# Patient Record
(demographics unavailable — no encounter records)

---

## 2017-01-15 NOTE — UC
Thomas ESTES Michael, scribed for Hawa Petty MD on 01/15/17 at 1851 .





 Complaint Female HPI





- HPI Summary


HPI Summary: 





24 y/o female comes to [facility] presenting with constant dysuria that started 

gradually today. The pt reports that the pain is a 3 out of 10 on a pain 

assessment scale. She also c/o vaginal discharge and abd pain. The abd pain is 

described as cramping and does not radiate to other parts of her body. She was 

drinking alcohol one ago and felt "hungover" this morning. She also had 

unprotected intercourse last night. The pt is  A2. She had one miscarriage 

and one . The  occurred in 2016. Her LNMP was one 

month ago. The PMHx is significant for DM and 2 Cesarian sections. The pt 

denies taking birth control. Pt is a type I DM on the pump. 











- History Of Current Complaint


Chief Complaint: UCGU


Stated Complaint: POSSIBLE UTI


Time Seen by Provider: 01/15/17 18:20


Hx Obtained From: Patient, Medical Records


Hx Last Menstrual Period: 


Pregnant?: No


Onset/Duration: Gradual Onset, Lasting Days, Still Present


Timing: Constant


Severity Initially: Mild


Severity Currently: Mild


Pain Intensity: 3


Pain Scale Used: 0-10 Numeric


Character: Cramping


Aggravating Factor(s): Nothing


Alleviating Factor(s): Nothing


Associated Signs And Symptoms: Positive: Vaginal Discharge.  Negative: Negative 

- positive dysuria and abd pain, Fever


Related Hx:  - 4, Para - 2, Prior STD Hx - chlamydia





- Risk Factors


Ectopic Pregnancy Risk Factor: Negative


Ovarian Torsion Risk Factor: Negative





- Allergies/Home Medications


Allergies/Adverse Reactions: 


 Allergies











Allergy/AdvReac Type Severity Reaction Status Date / Time


 


Amoxicillin Allergy Severe Anaphylatic Verified 16 17:48





   Shock  


 


Cefaclor [From Ceclor] Allergy Severe Anaphylatic Verified 16 17:48





   Shock  


 


Penicillins Allergy Severe Anaphylatic Verified 16 17:48





   Shock  














PMH/Surg Hx/FS Hx/Imm Hx


Endocrine History Of: Reports: Diabetes - Type I for 11 years.





- Surgical History


Surgical History: Yes


Surgery Procedure, Year, and Place: C-SECTIONS x 2





- Family History


Known Family History: Positive: None


   Negative: Diabetes, Seizure Disorder, Blood Disorder





- Social History


Occupation: Employed Full-time


Lives: Alone


Alcohol Use: Rare


Substance Use Type: None


Substance Use Comment - Amount & Last Used: occasional


Smoking Status (MU): Light Every Day Tobacco Smoker


Type: Cigarettes


Amount Used/How Often: 1 PPD


Have You Smoked in the Last Year: Yes





- Immunization History


Most Recent Influenza Vaccination: 


Most Recent Tetanus Shot: UNSURE


Most Recent Pneumonia Vaccination: NEVER





Review of Systems


Constitutional: Negative - fever


Gastrointestinal: Abdominal Pain


Genitourinary: Dysuria, Other - vaginal discharge


All Other Systems Reviewed And Are Negative: Yes





Physical Exam


Triage Information Reviewed: Yes


Appearance: Well-Appearing, Well-Nourished, Pain Distress - mild


Vital Signs: 


 Initial Vital Signs











Temp  98.5 F   01/15/17 18:23


 


Pulse  75   01/15/17 18:23


 


Resp  18   01/15/17 18:23


 


Pulse Ox  98   01/15/17 18:23











Vital Signs Reviewed: Yes


Eyes: Positive: Conjunctiva Clear


ENT Exam: Normal


Neck: Positive: Supple


Respiratory: Positive: Lungs clear, Normal breath sounds, No respiratory 

distress


Cardiovascular: Positive: RRR, No Murmur, Pulses Normal, Brisk Capillary Refill


Abdomen Description: Positive: Nontender, No Organomegaly, Soft, Other: - 

moderate vaginal discharge that was white. Cervix closed and non-tender. Uterus 

nml size and non-tender. Adnexae no masses and nontender.  Negative: CVA 

Tenderness (R), CVA Tenderness (L), Distended, Guarding, [place]'s [place] 

Tenderness, Peritoneal Signs


Bowel Sounds: Positive: Present


Musculoskeletal: Positive: Strength Intact, ROM Intact


Neurological: Positive: Alert, Muscle Tone Normal


Psychological Exam: Normal


Skin Exam: Normal





 Complaint Female Dx





- Course


Course Of Treatment: Pt declines HIV testing and blood testing (syphilis).  

States she would like to treated for all STD's, is allergic to PCN and 

cephalosporin





- Differential Dx/Diagnosis


Differential Diagnosis/HQI/PQRI: Pelvic Inflammatory Disease, Urinary Tract 

Infection


Provider Diagnoses: vaginitis.  dysuria





Discharge





- Discharge Plan


Condition: Stable


Disposition: HOME


Prescriptions: 


DOXYcycline CAP(*) [DOXYcycline 100MG CAP(*)] 100 mg PO BID #20 cap


Patient Education Materials:  Sexually Transmitted Diseases (ED)


Referrals: 


Barber Cifuentes MD [Primary Care Provider] - 


Additional Instructions: 


Dr. Petty has treated you with azithromycin 1000mg orally tonight, that is the 

treatment for chlamydia.


She gave the first pill and has also sent a prescription for doxycycline 100mg, 

that needs to be taken twice a day for 10 days.  This is the treatment for 

gonorrhea.


You have chosen to have us treat you empirically for STDS, and we have taken 

note of your penicillin and cefaclor allergy in choosing your treatment.  You 

have chosen not to be treated for syphilis or HIV at this time.  


We have also sent a culture of your urine and your vaginal discharge.


We will notify you if you need further treatment based on those cultures, which 

will be back in a few days. 


Return to urgent care if you have any new or worsening symptoms.  





The documentation as recorded by the Thomas cortez Michael accurately 

reflects the service I personally performed and the decisions made by me, Hawa Petty MD.

## 2017-03-25 NOTE — UC
Complaint Female HPI





- HPI Summary


HPI Summary: 





Pleasant 24 yo female presents for evaluation treatment of white vag d/c and 

pruritus over the last couple days.  No fever / chills.  No abd or pelvic pain.

  Hx of similar in the past, and diflucan has helped.  Wasn't able to see her 

doctor today, but plans to f/u.  Does have a hx T 1 DM, has a pump, last few 

blood sugars a little high in the 200's.  Last period one month ago, normal. 





- History Of Current Complaint


Chief Complaint: UCGU


Stated Complaint: DISCHARGE


Time Seen by Provider: 03/24/17 22:20


Hx Obtained From: Patient


Hx Last Menstrual Period: 2/24/17


Onset/Duration: Gradual Onset


Pain Intensity: 0


Pain Scale Used: 0-10 Numeric





- Allergies/Home Medications


Allergies/Adverse Reactions: 


 Allergies











Allergy/AdvReac Type Severity Reaction Status Date / Time


 


Amoxicillin Allergy Severe Anaphylatic Verified 03/24/17 21:13





   Shock  


 


Cefaclor [From Ceclor] Allergy Severe Anaphylatic Verified 03/24/17 21:13





   Shock  


 


Penicillins Allergy Severe Anaphylatic Verified 03/24/17 21:13





   Shock  











Home Medications: 


 Home Medications





Escitalopram Oxalate [Lexapro] 20 mg PO 03/24/17 [History]


hydrOXYzine HCL TAB* [Atarax TAB 50 MG *] 50 mg PO DAILY 03/24/17 [History 

Confirmed 03/24/17]











PMH/Surg Hx/FS Hx/Imm Hx


Previously Healthy: Yes - see hpi


Endocrine History Of: Reports: Diabetes - Type I for 11 years.


   Denies: Thyroid Disease, Hyperthyroidism, Hypothyroidism, Dyslipidemia


Cardiovascular History Of: 


   Denies: Cardiac Disorders, Hypertension, Pacemaker/ICD, Myocardial Infarction

, Congestive Heart Failure, Atrial Fibrillation, Deep Vein Thrombosis, Bleeding 

Disorders


Respiratory History Of: 


   Denies: COPD, Asthma, Bronchitis, Pneumonia, Pulmonary Embolism


GI/ History Of: 


   Denies: Gastroesophageal Reflux, Ulcer, Gastrointestinal Bleed, Gall Bladder 

Disease, Kidney Stones, Diverticulitis, Renal Disease, Urosepsis


Neurological History Of: 


   Denies: TIA, CVA, Dementia, Seizures, Migraine


Psychological History Of: 


   Denies: Anxiety, Depression, Bipolar Disorder, Schizophrenia, Post Traumatic 

Stress Disorder


Cancer History Of: 


   Denies: Lung Cancer, Colorectal Cancer, Breast Cancer, Prostate Cancer, 

Cervical Cancer


Other History Of: 


   Negative For: HIV, Hepatitis B, Hepatitis C





- Surgical History


Surgical History: Yes


Surgery Procedure, Year, and Place: C-SECTIONS x 2





- Family History


Known Family History: Positive: None


   Negative: Diabetes, Seizure Disorder, Blood Disorder





- Social History


Alcohol Use: None


Substance Use Type: None


Substance Use Comment - Amount & Last Used: occasional


Smoking Status (MU): Light Every Day Tobacco Smoker


Type: Cigarettes


Amount Used/How Often: 1 PPD


Length of Time of Smoking/Using Tobacco: 7


Have You Smoked in the Last Year: Yes





- Immunization History


Most Recent Influenza Vaccination: 2010


Most Recent Tetanus Shot: UNSURE


Most Recent Pneumonia Vaccination: NEVER





Review of Systems


Constitutional: Negative


Skin: Other - see hpi


Eyes: Negative


ENT: Negative


Respiratory: Negative


Cardiovascular: Negative


Gastrointestinal: Negative


Genitourinary: Other - freq / urgency.  But not sure if abnormal.


Motor: Negative


Neurovascular: Negative


Musculoskeletal: Negative


Neurological: Negative


Psychological: Negative


All Other Systems Reviewed And Are Negative: Yes





Physical Exam


Triage Information Reviewed: Yes


Appearance: Well-Appearing, Well-Nourished


Vital Signs: 


 Initial Vital Signs











Temp  97.7 F   03/24/17 21:06


 


Pulse  89   03/24/17 21:06


 


Resp  16   03/24/17 21:06


 


BP  119/64   03/24/17 21:06


 


Pulse Ox  96   03/24/17 21:06











Vital Signs Reviewed: Yes


ENT Exam: Normal


Neck exam: Normal


Respiratory Exam: Normal


Respiratory: Positive: Chest non-tender, Lungs clear, Normal breath sounds, No 

respiratory distress, No accessory muscle use


Cardiovascular Exam: Normal


Cardiovascular: Positive: RRR, No Murmur, Pulses Normal, Brisk Capillary Refill


Abdominal Exam: Normal


Abdomen Description: Positive: Nontender, No Organomegaly - pump in place, Soft

, Other: - Pelvic nontender.  No cvat.  Pt declines focused pelvic exam, citing 

that her current sx are just like past yeast vaginitis sx, most recently in the 

last 1-2 months.


Musculoskeletal Exam: Normal - gait steady.  moves all 4 ext's.


Neurological Exam: Normal - nonfocal.  grossly normal.


Psychological Exam: Normal - conversing easily and appropriately.


Skin Exam: Normal - no visible or reported rash, exp pruritus as per hpi





 Complaint Female Dx





- Course


Course Of Treatment: No new problems in CCC.  Reviewed urine dip / ucg (neg).  

Will start diflucan (we only have 100mg available here, as such will dose with 

200mg).  Rx written so that she can take following day.  Plans to f/u with pcp, 

per routine, will call on Monday.  Will seek medical attention sooner for worse 

or new problems in the meantime.  Questions answered as posed.





- Differential Dx/Diagnosis


Provider Diagnoses: Vaginitis.





Discharge





- Discharge Plan


Condition: Stable


Disposition: HOME


Prescriptions: 


Fluconazole 100 MG TAB* [Diflucan 100 MG TAB*] 150 mg PO DAILY #2 tab


Patient Education Materials:  Vulvovaginal Candidiasis (ED)


Referrals: 


Barber Cifuentes MD [Primary Care Provider] - 


Additional Instructions: 


Follow up with your primary care physician, per routine.  





You have a urine culture in the lab.  





Seek medical attention for worse or new problems.

## 2017-06-15 NOTE — UC
Complaint Female HPI





- HPI Summary


HPI Summary: 





Vaginal itching and burning for 1.5 weeks. Took oral diflucan last week with 

temporary relief, then much worse starting 3 days ago. Has also started a new 

sexual relationship with a male, interested in GC/Chlamydia testing and urine 

hcg due to late menses.





- History Of Current Complaint


Chief Complaint: UCGeneralIllness


Stated Complaint: PERSONAL


Time Seen by Provider: 06/15/17 14:02


Hx Obtained From: Patient


Hx Last Menstrual Period: May 6, 2017


Onset/Duration: Gradual Onset, Lasting Weeks


Timing: Intermittent


Severity Initially: Mild


Severity Currently: Mild


Character: Burning


Aggravating Factor(s): Urination


Associated Signs And Symptoms: Negative: Fever, Back Pain, Vaginal Bleeding/

Discharge, Vaginal Discharge, Vomiting(# Of Episodes =)





- Allergies/Home Medications


Allergies/Adverse Reactions: 


 Allergies











Allergy/AdvReac Type Severity Reaction Status Date / Time


 


Amoxicillin Allergy Severe Anaphylatic Verified 06/15/17 14:06





   Shock  


 


Cefaclor [From Ceclor] Allergy Severe Anaphylatic Verified 06/15/17 14:06





   Shock  


 


Penicillins Allergy Severe Anaphylatic Verified 06/15/17 14:06





   Shock  











Home Medications: 


 Home Medications





Miconazole Nitrate Vaginal [Monistat 1 Combo Pack 1200 & 2 mg & %]  06/15/17 [

History]











PMH/Surg Hx/FS Hx/Imm Hx


Other History Of: 


   Negative For: HIV, Hepatitis B, Hepatitis C





- Surgical History


Surgical History: Yes


Surgery Procedure, Year, and Place: C-SECTIONS x 2





- Family History


Known Family History: 


   Negative: Diabetes, Seizure Disorder, Blood Disorder





- Social History


Alcohol Use: None


Substance Use Type: None


Substance Use Comment - Amount & Last Used: occasional


Smoking Status (MU): Heavy Every Day Tobacco Smoker


Type: Cigarettes


Amount Used/How Often: 1 PPD


Length of Time of Smoking/Using Tobacco: 7


Have You Smoked in the Last Year: Yes


Cessation Counseling: Patient Advised to Stop





- Immunization History


Most Recent Influenza Vaccination: 2010


Most Recent Tetanus Shot: UNSURE


Most Recent Pneumonia Vaccination: NEVER





Review of Systems


Constitutional: Negative


Skin: Negative


Eyes: Negative


ENT: Negative


Respiratory: Negative


Cardiovascular: Negative


Gastrointestinal: Negative


Genitourinary: Other - vaginal itching


Motor: Negative


Neurovascular: Negative


Musculoskeletal: Negative


Neurological: Negative


Psychological: Negative


All Other Systems Reviewed And Are Negative: Yes





Physical Exam


Triage Information Reviewed: Yes


Appearance: Well-Appearing, No Pain Distress, Well-Nourished


Vital Signs: 


 Initial Vital Signs











Pulse  81   06/15/17 13:57


 


Resp  14   06/15/17 13:57


 


BP  108/59   06/15/17 13:57


 


Pulse Ox  99   06/15/17 13:57











Vital Signs Reviewed: Yes


Eye Exam: Normal


Eyes: Positive: Conjunctiva Clear


ENT Exam: Normal


ENT: Positive: Normal ENT inspection, Hearing grossly normal, Pharynx normal, 

TMs normal


Dental Exam: Normal


Neck exam: Normal


Neck: Positive: Supple, Nontender, No Lymphadenopathy


Respiratory Exam: Normal


Respiratory: Positive: Chest non-tender, Lungs clear, Normal breath sounds, No 

respiratory distress, No accessory muscle use


Cardiovascular Exam: Normal


Cardiovascular: Positive: RRR, No Murmur


Abdominal Exam: Normal


Abdomen Description: Positive: Nontender, No Organomegaly, Soft


Musculoskeletal Exam: Normal


Neurological Exam: Normal


Neurological: Positive: Alert


Psychological Exam: Normal


Skin Exam: Normal





- Additional Comments





pelvic exam performed, no ulcers, lesions, or masses to external genitalia. 

minor diffuse edema and redness to labia minora. Vaginal mucosa obscured by 

thick white vaginal medicine, swabs obtained and cervix nontender, no bleeding 

or masses. Adnexa nontender.





 Complaint Female Dx





- Differential Dx/Diagnosis


Provider Diagnoses: vaginitis, suspect yeast





Discharge





- Discharge Plan


Condition: Stable


Disposition: HOME


Prescriptions: 


Fluconazole 100 MG TAB* [Diflucan 100 MG TAB*] 150 mg PO DAILY #2 tab


Patient Education Materials:  Vaginitis (ED)


Referrals: 


Barber Cifuentes MD [Primary Care Provider] - 


Additional Instructions: 


I am treating you for yeast, but I have sent in testing for other possible 

causes for your symptoms as well. Please call or return if you have new or 

worsening symptoms.

## 2017-06-30 NOTE — ED
HPI Diabetic





- HPI Summary


HPI Summary: 


25F w/ PMH of DM1 presents with hypoglycemia today.  She was at home and her 

sugar was 30.  She has an insulin pump which she has had for 2 years.  She 

denies any recent illness.  This is the second episode of this this week.  Her 

boyfriend placed sugar in her gums and she became responsive again.  She states 

she has been more active recently.  She does not have anyone managing her DM at 

the moment as she lost her insurance and can not be seen by dr Coker anymore.  

She denies any cough, abdominal pain, or UTI symptoms








- History Of Current Complaint


Chief Complaint: EDDiabeticProb


Time Seen by Provider: 06/30/17 09:01





- Allergies/Home Medications


Allergies/Adverse Reactions: 


 Allergies











Allergy/AdvReac Type Severity Reaction Status Date / Time


 


Amoxicillin Allergy Severe Anaphylatic Verified 06/15/17 14:06





   Shock  


 


Cefaclor [From Ceclor] Allergy Severe Anaphylatic Verified 06/15/17 14:06





   Shock  


 


Penicillins Allergy Severe Anaphylatic Verified 06/15/17 14:06





   Shock  














PMH/Surg Hx/FS Hx/Imm Hx


Endocrine/Hematology History: Reports: Hx Diabetes - Type I for 11 years.


   Denies: Hx Thyroid Disease


Cardiovascular History: 


   Denies: Hx Congestive Heart Failure, Hx Deep Vein Thrombosis, Hx Hypertension

, Hx Myocardial Infarction, Hx Pacemaker/ICD


Respiratory History: 


   Denies: Hx Asthma, Hx Chronic Obstructive Pulmonary Disease (COPD), Hx Lung 

Cancer, Hx Pneumonia, Hx Pulmonary Embolism


GI History: 


   Denies: Hx Gall Bladder Disease, Hx Gastrointestinal Bleed, Hx Ulcer, Hx 

Urosepsis


 History: Reports: Other  Problems/Disorders - UTI


   Denies: Hx Kidney Stones, Hx Renal Disease


Neurological History: 


   Denies: Hx Dementia, Hx Migraine, Hx Seizures, Hx Transient Ischemic Attacks 

(TIA)


Psychiatric History: 


   Denies: Hx Anxiety, Hx Depression, Hx Schizophrenia, Hx Bipolar Disorder





- Surgical History


Surgery Procedure, Year, and Place: C-SE[facility] x 2


Hx Anesthesia Reactions: No


Infectious Disease History: No


Infectious Disease History: Reports: Hx of Known/Suspected MRSA - back june 2016


   Denies: Hx Clostridium Difficile, Hx Hepatitis, Hx Human Immunodeficiency 

Virus (HIV), Hx Shingles, Hx Tuberculosis, Hx Known/Suspected VRE, Hx Known/

Suspected VRSA, History Other Infectious Disease - MRSA, Traveled Outside the 

[place] in Last 30 Days





- Family History


Known Family History: 


   Negative: Diabetes, Seizure Disorder, Blood Disorder





- Social History


Alcohol Use: None


Hx Substance Use: No


Substance Use Type: Reports: None


Substance Use Comment - Amount & Last Used: occasional


Hx Tobacco Use: Yes


Smoking Status (MU): Heavy Every Day Tobacco Smoker


Type: Cigarettes


Amount Used/How Often: 1 PPD


Length of Time of Smoking/Using Tobacco: 7


Have You Smoked in the Last Year: Yes





Review of Systems


Negative: Fever


Negative: Chest Pain


Negative: Shortness Of Breath


Negative: Abdominal Pain


All Other Systems Reviewed And Are Negative: Yes





Physical Exam


Triage Information Reviewed: Yes


Vital Signs On Initial Exam: 


 Initial Vitals











Temp Pulse Resp BP Pulse Ox


 


 97.3 F   80   17   131/81   100 


 


 06/30/17 08:51  06/30/17 08:51  06/30/17 08:51  06/30/17 08:51  06/30/17 08:51











Vital Signs Reviewed: Yes


Appearance: Positive: Well-Appearing


Skin: Positive: Warm, Dry


Head/Face: Positive: Normal Head/Face Inspection


Eyes: Positive: Normal, Conjunctiva Clear


ENT: Positive: Normal ENT inspection, Pharynx normal, TMs normal


Respiratory/Lung Sounds: Positive: Clear to Auscultation, Breath Sounds Present


Cardiovascular: Positive: Normal, RRR


Abdomen Description: Positive: Nontender, Soft


Bowel Sounds: Positive: Present


Neurological: Positive: Sensory/Motor Intact, Alert, Oriented to Person Place, 

Time, CN Intact II-III





- Sendy Coma Scale


Coma Scale Total: 15





Diagnostics





- Vital Signs


 Vital Signs











  Temp Pulse Resp BP Pulse Ox


 


 06/30/17 09:06   68    100


 


 06/30/17 09:04  98.4 F  61  18  119/65  100


 


 06/30/17 09:03     119/65 


 


 06/30/17 08:51  97.3 F  80  17  131/81  100














- Laboratory


Lab Results: 


 Lab Results











  06/30/17 06/30/17 06/30/17 Range/Units





  08:59 09:45 09:45 


 


WBC   5.8   (3.5-10.8)  10^3/ul


 


RBC   4.15   (4.0-5.4)  10^6/ul


 


Hgb   13.0   (12.0-16.0)  g/dl


 


Hct   39   (35-47)  %


 


MCV   93   (80-97)  fL


 


MCH   31   (27-31)  pg


 


MCHC   34   (31-36)  g/dl


 


RDW   13   (10.5-15)  %


 


Plt Count   239   (150-450)  10^3/ul


 


MPV   8   (7.4-10.4)  um3


 


Neut % (Auto)   62.8   (38-83)  %


 


Lymph % (Auto)   30.3   (25-47)  %


 


Mono % (Auto)   5.1   (1-9)  %


 


Eos % (Auto)   1.2   (0-6)  %


 


Baso % (Auto)   0.6   (0-2)  %


 


Absolute Neuts (auto)   3.6   (1.5-7.7)  10^3/ul


 


Absolute Lymphs (auto)   1.8   (1.0-4.8)  10^3/ul


 


Absolute Monos (auto)   0.3   (0-0.8)  10^3/ul


 


Absolute Eos (auto)   0.1   (0-0.6)  10^3/ul


 


Absolute Basos (auto)   0   (0-0.2)  10^3/ul


 


Absolute Nucleated RBC   0   10^3/ul


 


Nucleated RBC %   0   


 


Sodium     (133-145)  mmol/L


 


Potassium     (3.5-5.0)  mmol/L


 


Chloride     (101-111)  mmol/L


 


Carbon Dioxide     (22-32)  mmol/L


 


Anion Gap     (2-11)  mmol/L


 


BUN     (6-24)  mg/dL


 


Creatinine     (0.51-0.95)  mg/dL


 


Est GFR ( Amer)     (>60)  


 


Est GFR (Non-Af Amer)     (>60)  


 


BUN/Creatinine Ratio     (8-20)  


 


Glucose     ()  mg/dL


 


POC Glucose (mg/dL)  59 L    ()  mg/dL


 


Calcium     (8.6-10.3)  mg/dL


 


Total Bilirubin     (0.2-1.0)  mg/dL


 


AST     (13-39)  U/L


 


ALT     (7-52)  U/L


 


Alkaline Phosphatase     ()  U/L


 


Total Protein     (6.4-8.9)  g/dL


 


Albumin     (3.2-5.2)  g/dL


 


Globulin     (2-4)  g/dL


 


Albumin/Globulin Ratio     (1-3)  


 


Urine Color    Yellow  


 


Urine Appearance    Clear  


 


Urine pH    6.0  (5-9)  


 


Ur Specific Gravity    1.018  (1.010-1.030)  


 


Urine Protein    Negative  (Negative)  


 


Urine Ketones    Negative  (Negative)  


 


Urine Blood    Negative  (Negative)  


 


Urine Nitrate    Negative  (Negative)  


 


Urine Bilirubin    Negative  (Negative)  


 


Urine Urobilinogen    Negative  (Negative)  


 


Ur Leukocyte Esterase    Negative  (Negative)  


 


Urine Glucose    Negative  (Negative)  














  06/30/17 Range/Units





  09:45 


 


WBC   (3.5-10.8)  10^3/ul


 


RBC   (4.0-5.4)  10^6/ul


 


Hgb   (12.0-16.0)  g/dl


 


Hct   (35-47)  %


 


MCV   (80-97)  fL


 


MCH   (27-31)  pg


 


MCHC   (31-36)  g/dl


 


RDW   (10.5-15)  %


 


Plt Count   (150-450)  10^3/ul


 


MPV   (7.4-10.4)  um3


 


Neut % (Auto)   (38-83)  %


 


Lymph % (Auto)   (25-47)  %


 


Mono % (Auto)   (1-9)  %


 


Eos % (Auto)   (0-6)  %


 


Baso % (Auto)   (0-2)  %


 


Absolute Neuts (auto)   (1.5-7.7)  10^3/ul


 


Absolute Lymphs (auto)   (1.0-4.8)  10^3/ul


 


Absolute Monos (auto)   (0-0.8)  10^3/ul


 


Absolute Eos (auto)   (0-0.6)  10^3/ul


 


Absolute Basos (auto)   (0-0.2)  10^3/ul


 


Absolute Nucleated RBC   10^3/ul


 


Nucleated RBC %   


 


Sodium  137  (133-145)  mmol/L


 


Potassium  3.9  (3.5-5.0)  mmol/L


 


Chloride  105  (101-111)  mmol/L


 


Carbon Dioxide  26  (22-32)  mmol/L


 


Anion Gap  6  (2-11)  mmol/L


 


BUN  10  (6-24)  mg/dL


 


Creatinine  0.92  (0.51-0.95)  mg/dL


 


Est GFR ( Amer)  95.7  (>60)  


 


Est GFR (Non-Af Amer)  74.4  (>60)  


 


BUN/Creatinine Ratio  10.9  (8-20)  


 


Glucose  103 H  ()  mg/dL


 


POC Glucose (mg/dL)   ()  mg/dL


 


Calcium  9.0  (8.6-10.3)  mg/dL


 


Total Bilirubin  0.40  (0.2-1.0)  mg/dL


 


AST  15  (13-39)  U/L


 


ALT  11  (7-52)  U/L


 


Alkaline Phosphatase  35  ()  U/L


 


Total Protein  6.6  (6.4-8.9)  g/dL


 


Albumin  4.1  (3.2-5.2)  g/dL


 


Globulin  2.5  (2-4)  g/dL


 


Albumin/Globulin Ratio  1.6  (1-3)  


 


Urine Color   


 


Urine Appearance   


 


Urine pH   (5-9)  


 


Ur Specific Gravity   (1.010-1.030)  


 


Urine Protein   (Negative)  


 


Urine Ketones   (Negative)  


 


Urine Blood   (Negative)  


 


Urine Nitrate   (Negative)  


 


Urine Bilirubin   (Negative)  


 


Urine Urobilinogen   (Negative)  


 


Ur Leukocyte Esterase   (Negative)  


 


Urine Glucose   (Negative)  











Result Diagrams: 


 06/30/17 09:45





 06/30/17 09:45


Lab Statement: Any lab studies that have been ordered have been reviewed, and 

results considered in the medical decision making process.





Diabetic Course/Dx





- Course


Course Of Treatment: 25F w/ PMH of DM1 presents with hypoglycemia today.  She 

was at home and her sugar was 30.  She has an insulin pump which she has had 

for 2 years.  She denies any recent illness.  This is the second episode of 

this this week.  Her boyfriend placed sugar in her gums and she became 

responsive again.  She states she has been more active recently.  She does not 

have anyone managing her DM at the moment as she lost her insurance and can not 

be seen by dr Coker anymore.  inital glucose was 59 so gave juice and sugar 103. 

patient does not have pump on at moment. discussed likely need to lower basal 

insulin be a couple units but needs follow up with endo. social work arranged 

follow up. sent script for insulin. patient understands and agrees with plan





- Diagnoses


Differential Dx: Hyperglycemia, Hypoglycemia


Provider Diagnoses: 


 Hypoglycemia








Discharge





- Discharge Plan


Condition: Good


Disposition: HOME


Prescriptions: 


Insulin Lispro [Humalog] 100 unit SC DAILY #3 vial


Patient Education Materials:  Hypoglycemia in a Person with Diabetes (ED)


Referrals: 


Jason Turcios MD [Medical Doctor] - 07/05/17 9:45 am (This is the soonest appt 

available. No other appts available for several weeks.)


Additional Instructions: 


Follow up with primary as soon as possible


Lower baseline insulin by a couple of units


Check blood sugar in the middle of the night to make sure do not become 

hypoglycemia


Return to ED if develop any new or worsening symptoms

## 2017-08-11 NOTE — UC
Complaint Female HPI





- HPI Summary


HPI Summary: 





25 year old female presents with complains of urinary frequency, urgency and 

burning. 





- History Of Current Complaint


Stated Complaint: POSS UTI


Time Seen by Provider: 08/11/17 15:15


Hx Last Menstrual Period: May 6, 2017





- Allergies/Home Medications


Allergies/Adverse Reactions: 


 Allergies











Allergy/AdvReac Type Severity Reaction Status Date / Time


 


Amoxicillin Allergy Severe Anaphylatic Verified 08/11/17 15:28





   Shock  


 


Cefaclor [From Ceclor] Allergy Severe Anaphylatic Verified 08/11/17 15:28





   Shock  


 


Penicillins Allergy Severe Anaphylatic Verified 08/11/17 15:28





   Shock  














PMH/Surg Hx/FS Hx/Imm Hx


Previously Healthy: Yes


Other History Of: 


   Negative For: HIV, Hepatitis B, Hepatitis C





- Surgical History


Surgical History: Yes


Surgery Procedure, Year, and Place: C-SE[facility] x 2





- Family History


Known Family History: 


   Negative: Diabetes, Seizure Disorder, Blood Disorder





- Social History


Alcohol Use: None


Substance Use Type: None


Substance Use Comment - Amount & Last Used: occasional


Smoking Status (MU): Heavy Every Day Tobacco Smoker


Type: Cigarettes


Amount Used/How Often: 1 PPD


Length of Time of Smoking/Using Tobacco: 7


Have You Smoked in the Last Year: Yes





- Immunization History


Most Recent Influenza Vaccination: 2010


Most Recent Tetanus Shot: UNSURE


Most Recent Pneumonia Vaccination: NEVER





Review of Systems


Constitutional: Negative


Skin: Negative


Eyes: Negative


ENT: Negative


Respiratory: Negative


Cardiovascular: Negative


Gastrointestinal: Negative


Genitourinary: Dysuria, Frequency, Urgency


Motor: Negative


Neurovascular: Negative


Musculoskeletal: Negative


Neurological: Negative


Psychological: Negative


All Other Systems Reviewed And Are Negative: Yes





Physical Exam


Triage Information Reviewed: Yes


Eye Exam: Normal


ENT Exam: Normal


Dental Exam: Normal


Neck exam: Normal


Neck: Positive: 1


Respiratory Exam: Normal


Cardiovascular Exam: Normal


Abdominal Exam: Normal


Musculoskeletal Exam: Normal


Neurological Exam: Normal


Psychological Exam: Normal


Skin Exam: Normal





 Complaint Female Dx





- Differential Dx/Diagnosis


Provider Diagnoses: urinary frequency.  urinary urgency





Discharge





- Discharge Plan


Condition: Stable


Disposition: HOME


Prescriptions: 


Fluconazole [Fluconazole 150 mg tab] 150 mg PO ONCE #1 tab


Nitrofurantoin Monohyd Macro [Macrobid] 100 mg PO BID #14 cap


Patient Education Materials:  Urinary Tract Infection in Women (ED)


Referrals: 


Barber Cifuentes MD [Primary Care Provider] - If Needed

## 2017-08-13 NOTE — ED
Course/Dx





- Course


Course Of Treatment: URINE CX CAME BACK WITH STAPH AUREUS THAT IS SUSCEPTIBLE 

TO MACROBID. PT RXED MACROBID X 7 DAYS.





- Diagnoses


Provider Diagnoses: 


 UTI (urinary tract infection)

## 2018-02-11 NOTE — ED
Ashkan ESTES Tecjoon, scribed for Dmitry Zhu MD on 02/11/18 at 0004 .





Skin Complaint





- HPI Summary


HPI Summary: 





This patient is a 26 year old female presenting to [facility] accompanied by male 

 with a chief complaint of skin abscess on right arm since 3 days ago. 

The pain is rated 7/10 in severity. Symptoms aggravated by nothing. Symptoms 

alleviated by nothing. Patient additionally reports chills, discharge from 

abscess.





- History of Current Complaint


Chief Complaint: EDRashSkinAbscess


Time Seen by Provider: 02/10/18 23:43


Stated Complaint: ABSCESS


Hx Obtained From: Patient


Hx Last Menstrual Period: May 6, 2017


Onset/Duration: Started Days Ago - 3, Still Present


Timing: Constant


Onset Severity: Mild


Current Severity: Moderate


Pain Intensity: 7


Pain Scale Used: 0-10 Numeric


Skin Location: Arm - right arm


Character: Hives, Redness


Aggravating Symptom(s): Nothing


Alleviating Symptom(s): Nothing


Associated Signs & Symptoms: Chills





- Additional Pertinent History


Primary Care Physician: SHILPA





- Allergy/Home Medications


Allergies/Adverse Reactions: 


 Allergies











Allergy/AdvReac Type Severity Reaction Status Date / Time


 


amoxicillin Allergy  Anaphylatic Verified 02/10/18 23:56





   Shock  


 


cefaclor [From Ceclor] Allergy  Anaphylatic Verified 02/10/18 23:56





   Shock  


 


Penicillins Allergy  Anaphylatic Verified 02/10/18 23:56





   Shock  














PMH/Surg Hx/FS Hx/Imm Hx


Previously Healthy: No


Endocrine/Hematology History: Reports: Hx Diabetes - Type I for 11 years.


   Denies: Hx Thyroid Disease


Cardiovascular History: 


   Denies: Hx Congestive Heart Failure, Hx Deep Vein Thrombosis, Hx Hypertension

, Hx Myocardial Infarction, Hx Pacemaker/ICD


Respiratory History: 


   Denies: Hx Asthma, Hx Chronic Obstructive Pulmonary Disease (COPD), Hx Lung 

Cancer, Hx Pneumonia, Hx Pulmonary Embolism


GI History: 


   Denies: Hx Gall Bladder Disease, Hx Gastrointestinal Bleed, Hx Ulcer, Hx 

Urosepsis


 History: Reports: Other  Problems/Disorders - UTI


   Denies: Hx Kidney Stones, Hx Renal Disease


Neurological History: 


   Denies: Hx Dementia, Hx Migraine, Hx Seizures, Hx Transient Ischemic Attacks 

(TIA)


Psychiatric History: 


   Denies: Hx Anxiety, Hx Depression, Hx Schizophrenia, Hx Bipolar Disorder





- Surgical History


Surgery Procedure, Year, and Place: C-SECTIONS x 2


Hx Anesthesia Reactions: No


Infectious Disease History: No


Infectious Disease History: Reports: Hx of Known/Suspected MRSA - back june 2016


   Denies: Hx Clostridium Difficile, Hx Hepatitis, Hx Human Immunodeficiency 

Virus (HIV), Hx Shingles, Hx Tuberculosis, Hx Known/Suspected VRE, Hx Known/

Suspected VRSA, History Other Infectious Disease - MRSA, Traveled Outside the 

[place] in Last 30 Days





- Family History


Known Family History: 


   Negative: Diabetes, Seizure Disorder, Blood Disorder





- Social History


Alcohol Use: None


Hx Substance Use: No


Substance Use Type: Reports: None


Substance Use Comment - Amount & Last Used: occasional


Hx Tobacco Use: Yes


Smoking Status (MU): Heavy Every Day Tobacco Smoker


Type: Cigarettes


Amount Used/How Often: 1 PPD


Length of Time of Smoking/Using Tobacco: 7


Have You Smoked in the Last Year: Yes





Review of Systems


Positive: Chills


Positive: Other - skin discharge, abscess


All Other Systems Reviewed And Are Negative: Yes





Physical Exam





- Summary


Physical Exam Summary: 





VITAL SIGNS: Reviewed.


GENERAL:  Patient is a well-developed and nourished female who is lying 

comfortable in the stretcher. Patient is not in any acute respiratory distress.


HEAD AND FACE: No signs of trauma. No ecchymosis, hematomas or skull 

depressions. No sinus tenderness.


EYES: PERRLA, EOMI x 2, No injected conjunctiva, no nystagmus.


EARS: Hearing grossly intact. Ear canals and tympanic membranes are within 

normal limits.


MOUTH: Oropharynx within normal limits.


NECK: Supple, trachea is midline, no adenopathy, no JVD, no carotid bruit, no c-

spine tenderness, neck with full ROM.


CHEST: Symmetric, no tenderness at palpation


LUNGS: Clear to auscultation bilaterally. No wheezing or crackles.


CVS: Regular rate and rhythm, S1 and S2 present, no murmurs or gallops 

appreciated.


ABDOMEN: Soft, non-tender. No signs of distention. No rebound no guarding, and 

no masses palpated. Bowel sounds are normal.


EXTREMITIES: [id number] area of erythema, tenderness. Scanty discharge over the 

erythema,culture sent


NEURO: Alert and oriented x 3. No acute neurological deficits. Speech is normal 

and follows commands.


SKIN: Dry and warm


Triage Information Reviewed: Yes


Vital Signs On Initial Exam: 


 Initial Vitals











Temp Pulse Resp BP Pulse Ox


 


 98.0 F   84   16   132/87   99 


 


 02/10/18 22:15  02/10/18 22:15  02/10/18 22:15  02/10/18 22:15  02/10/18 22:15











Vital Signs Reviewed: Yes





Diagnostics





- Vital Signs


 Vital Signs











  Temp Pulse Resp BP Pulse Ox


 


 02/10/18 22:15  98.0 F  84  16  132/87  99














- Laboratory


Lab Results: 


 Lab Results











  02/10/18 02/10/18 02/10/18 Range/Units





  23:40 23:40 23:40 


 


WBC  4.6    (3.5-10.8)  10^3/ul


 


RBC  4.07    (4.0-5.4)  10^6/ul


 


Hgb  12.0    (12.0-16.0)  g/dl


 


Hct  36    (35-47)  %


 


MCV  88    (80-97)  fL


 


MCH  30    (27-31)  pg


 


MCHC  34    (31-36)  g/dl


 


RDW  13    (10.5-15)  %


 


Plt Count  238    (150-450)  10^3/ul


 


MPV  8    (7.4-10.4)  um3


 


Neut % (Auto)  45.9    (38-83)  %


 


Lymph % (Auto)  39.5    (25-47)  %


 


Mono % (Auto)  9.0    (1-9)  %


 


Eos % (Auto)  5.2    (0-6)  %


 


Baso % (Auto)  0.4    (0-2)  %


 


Absolute Neuts (auto)  2.1    (1.5-7.7)  10^3/ul


 


Absolute Lymphs (auto)  1.8    (1.0-4.8)  10^3/ul


 


Absolute Monos (auto)  0.4    (0-0.8)  10^3/ul


 


Absolute Eos (auto)  0.2    (0-0.6)  10^3/ul


 


Absolute Basos (auto)  0    (0-0.2)  10^3/ul


 


Absolute Nucleated RBC  0    10^3/ul


 


Nucleated RBC %  0    


 


Sodium   132 L   (133-145)  mmol/L


 


Potassium   3.6   (3.5-5.0)  mmol/L


 


Chloride   97 L   (101-111)  mmol/L


 


Carbon Dioxide   29   (22-32)  mmol/L


 


Anion Gap   6   (2-11)  mmol/L


 


BUN   12   (6-24)  mg/dL


 


Creatinine   0.85   (0.51-0.95)  mg/dL


 


Est GFR ( Amer)   104.0   (>60)  


 


Est GFR (Non-Af Amer)   80.8   (>60)  


 


BUN/Creatinine Ratio   14.1   (8-20)  


 


Glucose   509 H*   ()  mg/dL


 


Lactic Acid    1.4  (0.5-2.0)  mmol/L


 


Calcium   9.2   (8.6-10.3)  mg/dL


 


Total Bilirubin   0.40   (0.2-1.0)  mg/dL


 


AST   47 H   (13-39)  U/L


 


ALT   46   (7-52)  U/L


 


Alkaline Phosphatase   74   ()  U/L


 


Total Protein   6.8   (6.4-8.9)  g/dL


 


Albumin   3.8   (3.2-5.2)  g/dL


 


Globulin   3.0   (2-4)  g/dL


 


Albumin/Globulin Ratio   1.3   (1-3)  











Result Diagrams: 


 02/10/18 23:40





 02/10/18 23:40


Lab Statement: Any lab studies that have been ordered have been reviewed, and 

results considered in the medical decision making process.





Course/Dx





- Course


Course Of Treatment: This patient is a 26 year old female presenting to [facility] 

accompanied by male  with a chief complaint of skin abscess on right 

arm since 3 days ago. Bloodwork Obtained. Urinalysis Obtained. In the ED course 

the patient was given Insulin, Toradol, Levofloxacin, Vancomycin. We discussed 

patient care with Dr. Crowder (Hospitalist) and they agreed to accept the 

patient. Patient will be admitted with a diagnosis of cellulitis and 

hyperglycemia. The patient is agreeable with this plan.





- Diagnoses


Provider Diagnoses: 


 Cellulitis, Hyperglycemia








- Physician Notifications


Discussed Care Of Patient With: Lashay Gibson NP - Hospitallist


Time Discussed With Above Provider: 00:48 - We discussed patient care with Dr. Crowder (Hospitalist) and they agreed to accept the patient. 


Instructed by Provider To: Admit As Inpatient





Discharge





- Discharge Plan


Condition: Stable


Disposition: ADMITTED TO [facility]


Referrals: 


No Primary Care Phys,NOPCP [Primary Care Provider] - 





The documentation as recorded by the Ashkan cortez Tecjoon accurately reflects 

the service I personally performed and the decisions made by me, Dmitry Zhu MD.

## 2018-02-11 NOTE — RAD
Indication: Read tender distal RIGHT elbow.



Comparison: No relevant prior exams available on the [facility] [place] for comparison.



Technique: Limited ultrasound of the RIGHT forearm. 



REPORT AND IMPRESSION:  Ultrasound caudal to the RIGHT elbow corresponding to the region

of clinical concern documents extensive infiltrative edema within the subcutaneous tissue

plane. No loculated fluid collection evident. No conspicuous foreign body evident.

## 2018-02-11 NOTE — HP
HISTORY AND PHYSICAL:

 

DATE OF ADMISSION:  02/11/18

 

TIME OF EVALUATION:  0100.

 

PRIMARY CARE PHYSICIAN:  The patient does not have a primary care physician.

 

CHIEF COMPLAINT:  Right upper extremity swelling, redness, and purulent 
drainage.

 

HISTORY OF PRESENT ILLNESS:  This is a 26-year-old female with past medical 
history of IV drug use with a history of MRSA infection who presents to the 
emergency room with worsening right forearm swelling, redness, and pain.  The 
patient states for the past 4 to 5 days, she has noticed her right forearm with 
redness, swelling, it was a hard bump initially and then was able to express pus
, but now there is an open wound there and she has significant amount of pain 
and discomfort.  She states yesterday she felt warm and vomited twice.  Today, 
she has been sleeping all day and very lethargic and no appetite.  She states 
her blood sugars are not really that well controlled.  She ran out of strips, 
had strips for several months and manages her insulin pump with carb counting 
and continuous infusion.  She has had an issue trying to reestablish with the 
primary care physician due to insurance reasons.  Regarding her IV drug use 
history, she states she last injected 3 months ago.  She has had history of 
MRSA infection on her back in the past.  She denies any chest pain or shortness 
of breath.  No further nausea, vomiting, no abdominal pain, no urinary symptoms
, otherwise remaining review of systems is negative.  In the emergency room, 
the patient had labs.  She was given a liter of fluid.  She was told to give 
herself 10 units of insulin via her insulin pump.  She was given 30 mg of 
Toradol and was ordered Levaquin and vancomycin and referred to the hospitalist 
service for further evaluation.

 

PAST MEDICAL HISTORY:

1.  History of  IV drug use, per patient stopped 3 months ago and is now on 
Suboxone.

2.  Type 1 diabetes, on insulin pump.

3.  History of MRSA in her back.

 

MEDICATIONS:

1.  Insulin pump.  The patient has continuous infusion and carb counts.

2.  Suboxone 8 mg every morning.

 

ALLERGIES:  AMOXICILLIN, CEFACLOR, and PENICILLIN, anaphylactic shock.

 

FAMILY HISTORY:  Reviewed and noncontributory.

 

SOCIAL HISTORY:  The patient lives with her boyfriend and his family. She has 
children, who are not in her custody.  She smokes about half a pack a day for 
the past 8 to 9 years.  No alcohol use or illicit drug use.  She works as a 
hairstylist.

 

REVIEW OF SYSTEMS:  A 14-point review of systems as mentioned in the HPI, 
otherwise negative.

 

                               PHYSICAL EXAMINATION

 

GENERAL:  No acute distress, resting comfortably with her boyfriend at the 
bedside.

 

VITAL SIGNS:  Temp 98, pulse rate 84, respiratory rate 16, oxygen saturation 99
% on room air, blood pressure 132/87.

 

HEENT:  Head:  Normocephalic.  Pupils:  Equal and reactive, anicteric.  
Oropharynx: Mucous membranes are moist.

 

NECK:  Supple.  No lymphadenopathy.

 

RESPIRATORY:  Clear to auscultation.  No wheezing, rhonchi, or rales.

 

CARDIAC:  Regular rate and rhythm.  No murmurs, rubs, or gallops.

 

ABDOMEN:  Soft, nontender, nondistended.

 

EXTREMITIES:  No clubbing, cyanosis, or edema.  The patient with her right 
upper extremity forearm with 4 to 6 cm confluent erythema and induration.  She 
has 1 mm open area, not able to express any fluid, and two adjacent pinpoint 
areas that are open.  She has good radial pulses.

 

NEUROLOGIC:  Alert and oriented x3.  No focal neurologic deficits.

 

SKIN:  No stigmata for endocarditis.

 

 LABORATORY DATA:  White count 4.6, hemoglobin 12, hematocrit 36, platelets 
238. Blood gas 7.36, pCO2 53.  Sodium 132, potassium 3.6, chloride 97, bicarb 29
, BUN 12, creatinine 0.89, glucose 509.  HCG is less than 0.6.

 

ASSESSMENT:  This is a 26-year-old female with past medical history of IV drug 
use. She states she is now on Suboxone who presents to the emergency room with 
worsening right upper extremity swelling, redness, and purulent drainage.

 

1.  Right upper extremity redness, swelling, and drainage.  Assessment:  The 
patient's findings are consistent with cellulitis and concern for abscess 
secondary to most likely IV drug use, but she states she has last used 3 months 
ago.  She has a history of MRSA in the past.  There is concern that there is 
still an abscess present on her physical exam.  

Plan:  We will continue on vanco and Levaquin and follow up on her wound 
culture and her blood cultures.  Continue to give IV fluids. We will order a 
soft tissue ultrasound to follow up to see if she needs further debridement.  
We will also place a social work consult and check a urine tox screen.  We will 
have them do an official med rec as well.



2.  Type 1 diabetes.  The patient's blood sugar is 500.  No evidence of DKA.  
She just received 10 units now.  Plan:  We will place her on a sliding scale 
with her pump for carb counting and correction factor sliding scale that the 
nurses can help instruct her how much to give.  We will check a hemoglobin A1c.
  Again, Social Work to help make sure that she has the adequate supplies in 
place for this insulin pump and follow up as well.



3.  IV drug use.  The patient on Suboxone.  Follow up with Social Work and 
urine tox screen.



4.  FEN:  Place patient on diabetic diet.



5.  DVT prophylaxis:  The patient scores no risk factors.  We will encourage 
ambulation.



6.  Code status:  Full code.

 

PATIENT TIME:  Greater than 50 minutes spent doing the history and physical, 
more than half the time spent in direct patient contact.

 

 

 

[id number]/CPS #: [id number]

MTDD

## 2018-02-11 NOTE — PN
Subjective


Date of Service: 02/11/18


Interval History: 





Up most of the night, feels tired, pain well controlled





Objective


Active Medications: 








Acetaminophen (Tylenol Tab*)  650 mg PO Q4H PRN


   PRN Reason: FEVER/PAIN


   Last Admin: 02/11/18 04:09 Dose:  650 mg


Al Hydrox/Mg Hydrox/Simethicone (Maalox Plus*)  30 ml PO Q6H PRN


   PRN Reason: INDIGESTION


Buprenorphine/Naloxone (Suboxone 8-2 Mg Sl Tab*)  1 tab.sl PO DAILY [facility]


Device (Nicotine Mouth Piece*)  1 each INH .USE WITH NICOTROL PRN


   PRN Reason: CRAVING


   Last Admin: 02/11/18 07:20 Dose:  1 each


Diphenhydramine HCl (Benadryl Po*)  25 mg PO Q6H PRN


   PRN Reason: ITCHING


   Last Admin: 02/11/18 06:42 Dose:  25 mg


Docusate Sodium (Colace Cap*)  100 mg PO BID PRN


   PRN Reason: CONSTIPATION


Sodium Chloride (Ns 0.9% 1000 Ml*)  1,000 mls @ 125 mls/hr IV PER RATE [facility]


   Last Admin: 02/11/18 04:10 Dose:  125 mls/hr


Levofloxacin/Dextrose (Levaquin 750 Mg Ivpremix(*))  750 mg in 150 mls @ 100 mls

/hr IVPB Q24H [facility]


Vancomycin HCl 750 mg/ Sodium (Chloride)  250 mls @ 166.667 mls/hr IVPB Q12H [facility]


Ketorolac Tromethamine (Toradol Inj*)  15 mg IV PUSH Q6H PRN


   PRN Reason: PAIN


Nicotine (Nicotine Inhaler*)  10 mg INH Q2H PRN


   PRN Reason: CRAVING


   Last Admin: 02/11/18 07:21 Dose:  10 mg


Ondansetron HCl (Zofran Inj*)  4 mg IV Q4H PRN


   PRN Reason: NAUSEA/VOMITING


Pharmacy Consult (Vancomycin Per Pharmacy*)  1 note FOLLOW UP . PRN


   PRN Reason: PER PROTOCOL


Pharmacy Profile Note (Vancomycin Trough Check)  1 note FOLLOW UP 1530 ONE


   Stop: 02/12/18 15:31


Senna (Senokot Tab*)  1 tab PO BID PRN


   PRN Reason: CONSTIPATION








 Vital Signs - 8 hr











  02/11/18 02/11/18 02/11/18





  01:30 02:00 03:20


 


Temperature   0 F


 


Pulse Rate 73 80 0


 


Respiratory   0





Rate   


 


Blood Pressure 129/69 119/64 0/0





(mmHg)   


 


O2 Sat by Pulse 99 98 0





Oximetry   














  02/11/18 02/11/18 02/11/18





  03:27 04:43 06:42


 


Temperature 98.4 F  


 


Pulse Rate 68  


 


Respiratory 16 16 14





Rate   


 


Blood Pressure 139/66  





(mmHg)   


 


O2 Sat by Pulse 100  





Oximetry   











Oxygen Devices in Use Now: None


Appearance: NAD


Eyes: No Scleral Icterus, PERRLA


Ears/Nose/Mouth/Throat: NL Teeth, Lips, Gums, Clear Oropharnyx, Mucous 

Membranes Moist


Neck: NL Appearance and Movements; NL JVP, Trachea Midline


Respiratory: Symmetrical Chest Expansion and Respiratory Effort, Clear to 

Auscultation


Cardiovascular: NL Sounds; No Murmurs; No JVD, RRR


Abdominal: NL Sounds; No Tenderness; No Distention, No Hepatosplenomegaly


Lymphatic: No Cervical Adenopathy


Extremities: No Edema, No Clubbing, Cyanosis


Skin: - - right medial forearm 1-2mm open ulceration draining serosangenous 

fluid, no odor, surrounding 2 in radius erythematous 


Neurological: Alert and Oriented x 3


Result Diagrams: 


 02/10/18 23:40





 02/10/18 23:40


Additional Lab and Data: 


 Lab Results











  02/10/18 02/10/18 02/10/18 Range/Units





  23:40 23:40 23:40 


 


WBC  4.6    (3.5-10.8)  10^3/ul


 


RBC  4.07    (4.0-5.4)  10^6/ul


 


Hgb  12.0    (12.0-16.0)  g/dl


 


Hct  36    (35-47)  %


 


MCV  88    (80-97)  fL


 


MCH  30    (27-31)  pg


 


MCHC  34    (31-36)  g/dl


 


RDW  13    (10.5-15)  %


 


Plt Count  238    (150-450)  10^3/ul


 


MPV  8    (7.4-10.4)  um3


 


Neut % (Auto)  45.9    (38-83)  %


 


Lymph % (Auto)  39.5    (25-47)  %


 


Mono % (Auto)  9.0    (1-9)  %


 


Eos % (Auto)  5.2    (0-6)  %


 


Baso % (Auto)  0.4    (0-2)  %


 


Absolute Neuts (auto)  2.1    (1.5-7.7)  10^3/ul


 


Absolute Lymphs (auto)  1.8    (1.0-4.8)  10^3/ul


 


Absolute Monos (auto)  0.4    (0-0.8)  10^3/ul


 


Absolute Eos (auto)  0.2    (0-0.6)  10^3/ul


 


Absolute Basos (auto)  0    (0-0.2)  10^3/ul


 


Absolute Nucleated RBC  0    10^3/ul


 


Nucleated RBC %  0    


 


Sodium   132 L   (133-145)  mmol/L


 


Potassium   3.6   (3.5-5.0)  mmol/L


 


Chloride   97 L   (101-111)  mmol/L


 


Carbon Dioxide   29   (22-32)  mmol/L


 


Anion Gap   6   (2-11)  mmol/L


 


BUN   12   (6-24)  mg/dL


 


Creatinine   0.85   (0.51-0.95)  mg/dL


 


Est GFR ( Amer)   104.0   (>60)  


 


Est GFR (Non-Af Amer)   80.8   (>60)  


 


BUN/Creatinine Ratio   14.1   (8-20)  


 


Glucose   509 H*   ()  mg/dL


 


Lactic Acid    1.4  (0.5-2.0)  mmol/L


 


Calcium   9.2   (8.6-10.3)  mg/dL


 


Total Bilirubin   0.40   (0.2-1.0)  mg/dL


 


AST   47 H   (13-39)  U/L


 


ALT   46   (7-52)  U/L


 


Alkaline Phosphatase   74   ()  U/L


 


Total Protein   6.8   (6.4-8.9)  g/dL


 


Albumin   3.8   (3.2-5.2)  g/dL


 


Globulin   3.0   (2-4)  g/dL


 


Albumin/Globulin Ratio   1.3   (1-3)  














Assess/Plan/Problems-Billing


Assessment: 





26 F h/o ICDU and Type I DM p/w right arm ulcer and cellulitis 





- Patient Problems


(1) Cellulitis


Comment: 


concern for underlying abscess, noted purulent drainage overnight, none this AM 


US pending, need for surgery and I&D to be determined


multiple allergies with noted anaphylaxis - tx levaquin 


MRSA negative - stop vancomycin    





(2) DKA, type 1


Comment: 


Continue with insulin pump 


Currently  has basal rate and self boluses based on carb counting and SS - will 

continue without change


Check HbA1c


Will need assistance established PCP for continued management    





(3) Nicotine abuse


Comment: inhaler


declines patch    





(4) IVDU (intravenous drug user)


Comment: reportedly none x 3-4 months


suboxone    





(5) DVT prophylaxis


Comment: low risk


OOB ad chito

## 2018-02-12 NOTE — CONS
CONSULTATION REPORT:

 

DATE OF CONSULTATION:  02/12/18

 

REQUESTING PROVIDER:  Ashanti Montez NP

 

CONSULTING SERVICE:  Infectious Disease.

 

REASON FOR CONSULTATION:  Right forearm infection.

 

IMPRESSION:

1.  Right forearm erythema, warmth, slight induration, no fluctuance.  There is 
a 2 mm wound with serous drainage and ultrasound did not show an abscess.  
Range of motion of her fingers and wrist is normal, appears to be a purulent 
cellulitis growing a strep constellatus likely due to underlying needle 
exposure.

2.  Last tetanus more than 10 years ago.

3.  PENICILLIN, AMOXICILLIN, and CEFACLOR caused anaphylactic shock.

 

RECOMMENDATIONS:

1.  Continue Levaquin.  We will add vancomycin until the susceptibility is back 
as strep constellatus is sometimes fairly resistant.

2.  Tetanus shot.

 

HISTORY OF PRESENT ILLNESS:  This is a 26-year-old female with injection drug 
use in brief remission with right forearm infection.  She is not entirely 
forthcoming with the details, but has had a couple of days of redness, pain, 
and swelling in the forearm without pain or range of motion of the elbow, wrist 
or fingers.  She came to the hospital, started on vancomycin and Levaquin.  
White count was 4.  She had ultrasound results as above.  She has not had 
infection like this in the past.

 

PAST MEDICAL HISTORY:

1.  Injection drug use.

2.  MRSA skin infection.

3.  Type 1 diabetes with insulin pump.

 

ALLERGIES:  AMOXICILLIN, CEFACLOR, PENICILLIN caused anaphylactic shock.

 

MEDICATIONS:

1.  Tylenol.

2.  Suboxone.

3.  Docusate.

4.  Insulin.

5.  Levaquin 750 mg IV daily.

6.  Nicotine inhaler.

 

SOCIAL HISTORY:  She lives with her significant other outside of [place].  No 
travel.

 

FAMILY HISTORY:  No recurrent infections.

 

REVIEW OF SYSTEMS:  All negative except as noted above.

 

PHYSICAL EXAMINATION:  Vital Signs:  Temperature is 36.4, heart rate 70, 
respiratory rate 14, blood pressure 125/54, oxygen saturation 99% on room air.  
In general, she is awake and not in distress.  Neurologic:  She is oriented x3. 
Follows all commands.  HEENT:  There is no thrush.  Heart is regular rate and 
rhythm without murmurs, rubs, or gallops.  Lungs:  Clear to auscultation 
bilaterally.  Abdomen:  Soft, nontender, nondistended.  There are bowel sounds 
present.  Skin:  There is no rash or splinter hemorrhages.  There is a right 
forearm 5 cm area of patchy erythema, which is blanching.  There is a central 3 
mm wound with serous drainage.  There is no crepitus or fluctuance.  
Musculoskeletal: There is no spine tenderness to palpation or joint synovitis.

 

LABORATORY DATA:  White blood cell count 4, hemoglobin 12, platelets 238. 
Urinalysis shows glucose.

 

Please see impressions and recommendations as outlined above, which I have 
discussed with Ashanti Montez NP.

 

Thank you for asking me to see Ms. Handy in consultation.

 

 [id number]/CPS #: [id number]

[facility].O. [facility] HospitalKAREEN

## 2018-02-12 NOTE — PN
Subjective


Date of Service: 02/12/18


Interval History: 





no complaints, denies arm pain,  continue to have drainage from right arm.  

Denies chest pain or shortness of breath.   Denies abd pain or N/V/D


Family History: Unchanged from Admission


Social History: Unchanged from Admission


Past Medical History: Unchanged from Admission





Objective


Active Medications: 








Acetaminophen (Tylenol Tab*)  650 mg PO Q4H PRN


   PRN Reason: FEVER/PAIN


   Last Admin: 02/12/18 11:58 Dose:  650 mg


Al Hydrox/Mg Hydrox/Simethicone (Maalox Plus*)  30 ml PO Q6H PRN


   PRN Reason: INDIGESTION


Buprenorphine/Naloxone (Suboxone 8-2 Mg Sl Tab*)  1 tab.sl PO DAILY [facility]


   Last Admin: 02/12/18 08:05 Dose:  1 tab.sl


Device (Nicotine Mouth Piece*)  1 each INH .USE WITH NICOTROL PRN


   PRN Reason: CRAVING


   Last Admin: 02/11/18 07:20 Dose:  1 each


Diphenhydramine HCl (Benadryl Po*)  25 mg PO Q6H PRN


   PRN Reason: ITCHING


   Last Admin: 02/12/18 15:36 Dose:  25 mg


Docusate Sodium (Colace Cap*)  100 mg PO BID PRN


   PRN Reason: CONSTIPATION


   Last Admin: 02/11/18 19:35 Dose:  100 mg


Levofloxacin/Dextrose (Levaquin 750 Mg Ivpremix(*))  750 mg in 150 mls @ 100 mls

/hr IVPB Q24H [facility]


   Last Admin: 02/12/18 01:03 Dose:  100 mls/hr


Vancomycin HCl 1,000 mg/ (Sodium Chloride)  250 mls @ 166.667 mls/hr IVPB Q8H 

[facility]


Ketorolac Tromethamine (Toradol Inj*)  15 mg IV PUSH Q6H PRN


   PRN Reason: PAIN


Nicotine (Nicotine Inhaler*)  10 mg INH Q2H PRN


   PRN Reason: CRAVING


   Last Admin: 02/12/18 14:52 Dose:  10 mg


Ondansetron HCl (Zofran Inj*)  4 mg IV Q4H PRN


   PRN Reason: NAUSEA/VOMITING


Pharmacy Consult (Vancomycin Per Pharmacy*)  1 note FOLLOW UP . PRN


   PRN Reason: PER PROTOCOL


Pharmacy Profile Note (Vancomycin Trough Check)  1 note FOLLOW UP 1330 ONE


   Stop: 02/13/18 13:31


Senna (Senokot Tab*)  1 tab PO BID PRN


   PRN Reason: CONSTIPATION








 Vital Signs - 8 hr











  02/12/18 02/12/18 02/12/18





  07:48 08:00 08:05


 


Temperature 97.6 F  


 


Pulse Rate 49  


 


Respiratory 16 16 16





Rate   


 


Blood Pressure 133/58  





(mmHg)   


 


O2 Sat by Pulse 100  





Oximetry   














  02/12/18 02/12/18 02/12/18





  11:29 11:58 15:36


 


Temperature 97.7 F  


 


Pulse Rate 73  


 


Respiratory 16 16 16





Rate   


 


Blood Pressure 133/59  





(mmHg)   


 


O2 Sat by Pulse 99  





Oximetry   











Oxygen Devices in Use Now: None


Appearance: appears comfortable lying in bed


Eyes: No Scleral Icterus


Ears/Nose/Mouth/Throat: Clear Oropharnyx, Mucous Membranes Moist


Neck: NL Appearance and Movements; NL JVP, Trachea Midline


Respiratory: Symmetrical Chest Expansion and Respiratory Effort, Clear to 

Auscultation


Cardiovascular: NL Sounds; No Murmurs; No JVD, RRR, No Edema


Abdominal: NL Sounds; No Tenderness; No Distention


Extremities: No Edema, No Clubbing, Cyanosis, - - full ROM to right arm CMST's 

intact to right arm radial pulse +2.  flexion and extension intact to right and 

elbow. abduction and adduction intact to right hand


Skin: - - dressing intact to right forearm, small amount of drainage noted to 

the dressing. mild redness and small open area noted to the anterior forearm


Neurological: Alert and Oriented x 3, NL Sensation, NL Gait, NL Muscle Strength 

and Tone


Nutrition: Taking PO's


Result Diagrams: 


 02/10/18 23:40





 02/10/18 23:40


Additional Lab and Data: 


 Lab Results











  02/10/18 02/10/18 02/10/18 Range/Units





  23:40 23:40 23:40 


 


WBC  4.6    (3.5-10.8)  10^3/ul


 


RBC  4.07    (4.0-5.4)  10^6/ul


 


Hgb  12.0    (12.0-16.0)  g/dl


 


Hct  36    (35-47)  %


 


MCV  88    (80-97)  fL


 


MCH  30    (27-31)  pg


 


MCHC  34    (31-36)  g/dl


 


RDW  13    (10.5-15)  %


 


Plt Count  238    (150-450)  10^3/ul


 


MPV  8    (7.4-10.4)  um3


 


Neut % (Auto)  45.9    (38-83)  %


 


Lymph % (Auto)  39.5    (25-47)  %


 


Mono % (Auto)  9.0    (1-9)  %


 


Eos % (Auto)  5.2    (0-6)  %


 


Baso % (Auto)  0.4    (0-2)  %


 


Absolute Neuts (auto)  2.1    (1.5-7.7)  10^3/ul


 


Absolute Lymphs (auto)  1.8    (1.0-4.8)  10^3/ul


 


Absolute Monos (auto)  0.4    (0-0.8)  10^3/ul


 


Absolute Eos (auto)  0.2    (0-0.6)  10^3/ul


 


Absolute Basos (auto)  0    (0-0.2)  10^3/ul


 


Absolute Nucleated RBC  0    10^3/ul


 


Nucleated RBC %  0    


 


Sodium   132 L   (133-145)  mmol/L


 


Potassium   3.6   (3.5-5.0)  mmol/L


 


Chloride   97 L   (101-111)  mmol/L


 


Carbon Dioxide   29   (22-32)  mmol/L


 


Anion Gap   6   (2-11)  mmol/L


 


BUN   12   (6-24)  mg/dL


 


Creatinine   0.85   (0.51-0.95)  mg/dL


 


Est GFR ( Amer)   104.0   (>60)  


 


Est GFR (Non-Af Amer)   80.8   (>60)  


 


BUN/Creatinine Ratio   14.1   (8-20)  


 


Glucose   509 H*   ()  mg/dL


 


Lactic Acid    1.4  (0.5-2.0)  mmol/L


 


Calcium   9.2   (8.6-10.3)  mg/dL


 


Total Bilirubin   0.40   (0.2-1.0)  mg/dL


 


AST   47 H   (13-39)  U/L


 


ALT   46   (7-52)  U/L


 


Alkaline Phosphatase   74   ()  U/L


 


Total Protein   6.8   (6.4-8.9)  g/dL


 


Albumin   3.8   (3.2-5.2)  g/dL


 


Globulin   3.0   (2-4)  g/dL


 


Albumin/Globulin Ratio   1.3   (1-3)  














Assess/Plan/Problems-Billing


Assessment: 





26 F h/o ICDU and Type I DM p/w right arm ulcer and cellulitis 





- Patient Problems


(1) Cellulitis


Current Visit: Yes   Status: Acute   Code(s): L03.90 - CELLULITIS, UNSPECIFIED 

  SNOMED Code(s): 462562134


   Comment: 


concern for underlying abscess, noted purulent drainage overnight, none this AM 


US~ no abscess 


Patient remains afebrile 


multiple allergies with noted anaphylaxis - tx levaquin 


MRSA negative - stop vancomycin    





(2) DKA, type 1


Current Visit: Yes   Status: Acute   Code(s): E10.10 - TYPE 1 DIABETES MELLITUS 

WITH KETOACIDOSIS WITHOUT COMA   SNOMED Code(s): 193669282


   Comment: 


Continue with insulin pump 


Currently  has basal rate and self boluses based on carb counting and SS - will 

continue without change


Check HbA1c~ 9.3


Bllod glucose 246 this AM





Will need assistance established PCP for continued management    





(3) IVDU (intravenous drug user)


Current Visit: Yes   Status: Acute   Code(s): F19.90 - OTHER PSYCHOACTIVE 

SUBSTANCE USE, UNSPECIFIED, UNCOMPLICATED   SNOMED Code(s): 994678801


   Comment: reportedly none x 3-4 months


suboxone    





(4) Nicotine abuse


Current Visit: Yes   Status: Acute   Code(s): Z72.0 - TOBACCO USE   SNOMED Code(

s): 633525719


   Comment: inhaler


declines patch    





(5) DVT prophylaxis


Current Visit: Yes   Status: Acute   Code(s): JNS3477 -    SNOMED Code(s): 

407494991


   Comment: low risk


OOB ad chito   





(6) Full code status


Current Visit: Yes   Status: Acute   Code(s): Z78.9 - OTHER SPECIFIED HEALTH 

STATUS   SNOMED Code(s): 617892175

## 2018-02-13 NOTE — PN
Subjective


Date of Service: 02/13/18


Interval History: 





Patient states that she is feeling better.  Denies chest pain or shortness of 

breath.  Denies abd pain, N/V/D.


Family History: Unchanged from Admission


Social History: Unchanged from Admission


Past Medical History: Unchanged from Admission





Objective


Active Medications: 








Acetaminophen (Tylenol Tab*)  650 mg PO Q4H PRN


   PRN Reason: FEVER/PAIN


   Last Admin: 02/13/18 10:22 Dose:  650 mg


Al Hydrox/Mg Hydrox/Simethicone (Maalox Plus*)  30 ml PO Q6H PRN


   PRN Reason: INDIGESTION


Buprenorphine/Naloxone (Suboxone 8-2 Mg Sl Tab*)  1 tab.sl PO DAILY [facility]


   Last Admin: 02/13/18 10:22 Dose:  1 tab.sl


Device (Nicotine Mouth Piece*)  1 each INH .USE WITH NICOTROL PRN


   PRN Reason: CRAVING


   Last Admin: 02/11/18 07:20 Dose:  1 each


Dextrose (D50w Syringe 50 Ml*)  12.5 gm IV PUSH .FOR FS < 60 - SS PRN


   PRN Reason: FS < 60


Diphenhydramine HCl (Benadryl Po*)  25 mg PO Q6H PRN


   PRN Reason: ITCHING


   Last Admin: 02/13/18 15:44 Dose:  25 mg


Docusate Sodium (Colace Cap*)  100 mg PO BID PRN


   PRN Reason: CONSTIPATION


   Last Admin: 02/11/18 19:35 Dose:  100 mg


Levofloxacin/Dextrose (Levaquin 750 Mg Ivpremix(*))  750 mg in 150 mls @ 100 mls

/hr IVPB Q24H [facility]


   Last Admin: 02/13/18 02:54 Dose:  100 mls/hr


Vancomycin HCl 1,000 mg/ (Sodium Chloride)  250 mls @ 166.667 mls/hr IVPB Q8H 

[facility]


   Last Admin: 02/13/18 15:44 Dose:  166.667 mls/hr


Insulin Glargine (Lantus(*))  20 units SUBCUT Q24H [facility]


Insulin Human Lispro (Humalog*)  0 units SUBCUT ACHS [facility]


   PRN Reason: Protocol


Insulin Human Lispro (Humalog*)  0 units SUBCUT ACHS [facility]


   PRN Reason: Protocol


Ketorolac Tromethamine (Toradol Inj*)  15 mg IV PUSH Q6H PRN


   PRN Reason: PAIN


Nicotine (Nicotine Inhaler*)  10 mg INH Q2H PRN


   PRN Reason: CRAVING


   Last Admin: 02/13/18 15:13 Dose:  10 mg


Ondansetron HCl (Zofran Inj*)  4 mg IV Q4H PRN


   PRN Reason: NAUSEA/VOMITING


Pharmacy Consult (Vancomycin Per Pharmacy*)  1 note FOLLOW UP . PRN


   PRN Reason: PER PROTOCOL


Senna (Senokot Tab*)  1 tab PO BID PRN


   PRN Reason: CONSTIPATION








 Vital Signs - 8 hr











  02/13/18 02/13/18 02/13/18





  08:16 10:22 11:38


 


Temperature   97.6 F


 


Pulse Rate   52


 


Respiratory 16 16 18





Rate   


 


Blood Pressure   119/44





(mmHg)   


 


O2 Sat by Pulse   99





Oximetry   














  02/13/18 02/13/18





  12:27 15:44


 


Temperature  


 


Pulse Rate  


 


Respiratory 16 16





Rate  


 


Blood Pressure  





(mmHg)  


 


O2 Sat by Pulse  





Oximetry  











Oxygen Devices in Use Now: None


Appearance: appears comfortable sitting in bed


Eyes: No Scleral Icterus


Ears/Nose/Mouth/Throat: Clear Oropharnyx, Mucous Membranes Moist


Neck: NL Appearance and Movements; NL JVP, Trachea Midline


Respiratory: Symmetrical Chest Expansion and Respiratory Effort, Clear to 

Auscultation


Cardiovascular: NL Sounds; No Murmurs; No JVD, No Edema


Abdominal: NL Sounds; No Tenderness; No Distention


Extremities: No Edema, No Clubbing, Cyanosis


Skin: - - right forearm with mild redness and induration.  Small open area 

noted draining purlent drainage.  


Neurological: Alert and Oriented x 3, NL Gait, NL Muscle Strength and Tone


Nutrition: Taking PO's


Result Diagrams: 


 02/10/18 23:40





 02/13/18 14:47


Additional Lab and Data: 


 Lab Results











  02/10/18 02/10/18 02/10/18 Range/Units





  23:40 23:40 23:40 


 


WBC  4.6    (3.5-10.8)  10^3/ul


 


RBC  4.07    (4.0-5.4)  10^6/ul


 


Hgb  12.0    (12.0-16.0)  g/dl


 


Hct  36    (35-47)  %


 


MCV  88    (80-97)  fL


 


MCH  30    (27-31)  pg


 


MCHC  34    (31-36)  g/dl


 


RDW  13    (10.5-15)  %


 


Plt Count  238    (150-450)  10^3/ul


 


MPV  8    (7.4-10.4)  um3


 


Neut % (Auto)  45.9    (38-83)  %


 


Lymph % (Auto)  39.5    (25-47)  %


 


Mono % (Auto)  9.0    (1-9)  %


 


Eos % (Auto)  5.2    (0-6)  %


 


Baso % (Auto)  0.4    (0-2)  %


 


Absolute Neuts (auto)  2.1    (1.5-7.7)  10^3/ul


 


Absolute Lymphs (auto)  1.8    (1.0-4.8)  10^3/ul


 


Absolute Monos (auto)  0.4    (0-0.8)  10^3/ul


 


Absolute Eos (auto)  0.2    (0-0.6)  10^3/ul


 


Absolute Basos (auto)  0    (0-0.2)  10^3/ul


 


Absolute Nucleated RBC  0    10^3/ul


 


Nucleated RBC %  0    


 


Sodium   132 L   (133-145)  mmol/L


 


Potassium   3.6   (3.5-5.0)  mmol/L


 


Chloride   97 L   (101-111)  mmol/L


 


Carbon Dioxide   29   (22-32)  mmol/L


 


Anion Gap   6   (2-11)  mmol/L


 


BUN   12   (6-24)  mg/dL


 


Creatinine   0.85   (0.51-0.95)  mg/dL


 


Est GFR ( Amer)   104.0   (>60)  


 


Est GFR (Non-Af Amer)   80.8   (>60)  


 


BUN/Creatinine Ratio   14.1   (8-20)  


 


Glucose   509 H*   ()  mg/dL


 


Lactic Acid    1.4  (0.5-2.0)  mmol/L


 


Calcium   9.2   (8.6-10.3)  mg/dL


 


Total Bilirubin   0.40   (0.2-1.0)  mg/dL


 


AST   47 H   (13-39)  U/L


 


ALT   46   (7-52)  U/L


 


Alkaline Phosphatase   74   ()  U/L


 


Total Protein   6.8   (6.4-8.9)  g/dL


 


Albumin   3.8   (3.2-5.2)  g/dL


 


Globulin   3.0   (2-4)  g/dL


 


Albumin/Globulin Ratio   1.3   (1-3)  














Assess/Plan/Problems-Billing


Assessment: 





26 F h/o ICDU and Type I DM p/w right arm ulcer and cellulitis 





- Patient Problems


(1) Cellulitis


Current Visit: Yes   Status: Acute   Code(s): L03.90 - CELLULITIS, UNSPECIFIED 

  SNOMED Code(s): 907866658


   Comment: 


concern for underlying abscess, noted purulent drainage overnight, minimal 

drainage this AM 


US~ no abscess 


Patient remains afebrile 


multiple allergies with noted anaphylaxis - tx levaquin 


MRSA negative - vancomycin started yesterday 


Will discharge home on Levaquin 750 mg daily for 10 days    





(2) DKA, type 1


Current Visit: Yes   Status: Acute   Code(s): E10.10 - TYPE 1 DIABETES MELLITUS 

WITH KETOACIDOSIS WITHOUT COMA   SNOMED Code(s): 055847286


   Comment: 


Continue with insulin pump ~ stopped will place on SS scale and CC coverage and 

lantus with AC and HS finger sticks 


Currently  has basal rate and self boluses based on carb counting and SS - will 

continue ~ patient reports that her insulin is old has not had a new script in 

1 years, just recently had old PMD refill her insulin as she was out.  reports 

that she has not had supplies for her insulin pump sent to her 6 months- states 

nayana laura had supplies that were leftover and has been using them-   she is not 

forthcoming with information about her supplies.


Will have case management see to ensure supplies are available





Check HbA1c~ 9.3





Bllod glucose 246 this AM





Will need assistance established PCP for continued management    





(3) IVDU (intravenous drug user)


Current Visit: Yes   Status: Acute   Code(s): F19.90 - OTHER PSYCHOACTIVE 

SUBSTANCE USE, UNSPECIFIED, UNCOMPLICATED   SNOMED Code(s): 599604276


   Comment: reportedly none x 3-4 months


suboxone    





(4) Nicotine abuse


Current Visit: Yes   Status: Acute   Code(s): Z72.0 - TOBACCO USE   SNOMED Code(

s): 292862277


   Comment: inhaler


declines patch    





(5) DVT prophylaxis


Current Visit: Yes   Status: Acute   Code(s): YEU6126 -    SNOMED Code(s): 

808198016


   Comment: low risk


OOB ad chito   





(6) Full code status


Current Visit: Yes   Status: Acute   Code(s): Z78.9 - OTHER SPECIFIED HEALTH 

STATUS   SNOMED Code(s): 641227104

## 2018-02-14 NOTE — PN
Subjective


Date of Service: 02/14/18


Interval History: 





STATES THAT SHE IS FEELING BETTER ,  DENIES CHEST PAIN OR SHORTNESS OF BREATH , 

DENIES ABD PAIN , N/V/D


Family History: Unchanged from Admission


Social History: Unchanged from Admission


Past Medical History: Unchanged from Admission





Objective


Active Medications: 








Acetaminophen (Tylenol Tab*)  650 mg PO Q4H PRN


   PRN Reason: FEVER/PAIN


   Last Admin: 02/13/18 10:22 Dose:  650 mg


Al Hydrox/Mg Hydrox/Simethicone (Maalox Plus*)  30 ml PO Q6H PRN


   PRN Reason: INDIGESTION


Buprenorphine/Naloxone (Suboxone 8-2 Mg Sl Tab*)  [facility] tab.sl [facility]


   Last Admin: 02/14/18 09:48 Dose:  1 tab.sl


Device (Nicotine Mouth Piece*)  1 each INH .USE WITH NICOTROL PRN


   PRN Reason: CRAVING


   Last Admin: 02/11/18 07:20 Dose:  1 each


Dextrose (D50w Syringe 50 Ml*)  12.5 gm IV PUSH .FOR FS < 60 - SS PRN


   PRN Reason: FS < 60


Diphenhydramine HCl (Benadryl Po*)  25 mg PO Q6H PRN


   PRN Reason: ITCHING


   Last Admin: 02/14/18 14:00 Dose:  25 mg


Docusate Sodium (Colace Cap*)  100 mg PO BID PRN


   PRN Reason: CONSTIPATION


   Last Admin: 02/11/18 19:35 Dose:  100 mg


Levofloxacin/Dextrose (Levaquin 750 Mg Ivpremix(*))  750 mg in 150 mls @ 100 mls

/hr IVPB [facility]H [facility]


   Last Admin: 02/14/18 02:30 Dose:  100 mls/hr


Vancomycin HCl 1,000 mg/ (Sodium Chloride)  250 mls @ 166.667 mls/hr IVPB Q8H 

[facility]


   Last Admin: 02/14/18 13:57 Dose:  166.667 mls/hr


Insulin Glargine (Lantus(*))  20 units SUBCUT [facility]24H [facility]


   Last Admin: 02/13/18 16:45 Dose:  20 units


Insulin Human Lispro (Humalog*)  [facility]


   PRN Reason: Protocol


   Last Admin: 02/14/18 13:55 Dose:  2 units


Insulin Human Lispro (Humalog*)  0 units SUBCUT ACHS [facility]


   PRN Reason: Protocol


   Last Admin: 02/14/18 13:56 Dose:  5 units


Ketorolac Tromethamine (Toradol Inj*)  15 mg IV PUSH Q6H PRN


   PRN Reason: PAIN


Nicotine (Nicotine Inhaler*)  10 mg INH Q2H PRN


   PRN Reason: CRAVING


   Last Admin: 02/14/18 12:59 Dose:  10 mg


Ondansetron HCl (Zofran Inj*)  4 mg IV Q4H PRN


   PRN Reason: NAUSEA/VOMITING


Pharmacy Consult (Vancomycin Per Pharmacy*)  1 note FOLLOW UP . PRN


   PRN Reason: PER PROTOCOL


Senna (Senokot Tab*)  1 tab PO BID PRN


   PRN Reason: CONSTIPATION








 Vital Signs - 8 hr











  02/14/18 02/14/18 02/14/18





  09:48 11:29 12:22


 


Temperature  98.4 F 


 


Pulse Rate  70 


 


Respiratory 18 16 16





Rate   


 


Blood Pressure  165/77 





(mmHg)   


 


O2 Sat by Pulse  99 





Oximetry   














  02/14/18





  14:00


 


Temperature 


 


Pulse Rate 


 


Respiratory 18





Rate 


 


Blood Pressure 





(mmHg) 


 


O2 Sat by Pulse 





Oximetry 











Oxygen Devices in Use Now: None


Appearance: APPEARS COMFORTABLE SITTING IN BED


Eyes: No Scleral Icterus


Ears/Nose/Mouth/Throat: Clear Oropharnyx, Mucous Membranes Moist


Neck: NL Appearance and Movements; NL JVP, Trachea Midline


Respiratory: Symmetrical Chest Expansion and Respiratory Effort, Clear to 

Auscultation


Cardiovascular: NL Sounds; No Murmurs; No JVD, RRR, No Edema


Abdominal: NL Sounds; No Tenderness; No Distention


Extremities: No Edema, No Clubbing, Cyanosis


Skin: - - ERYTHEMA AND MILD SWELLING NOTED TO RIGHT FOREARM- IMPROVING MINIMAL 

DRAINAGE


Result Diagrams: 


 02/14/18 04:09





 02/14/18 04:09


Additional Lab and Data: 


 Lab Results











  02/10/18 02/10/18 02/10/18 Range/Units





  23:40 23:40 23:40 


 


WBC  4.6    (3.5-10.8)  10^3/ul


 


RBC  4.07    (4.0-5.4)  10^6/ul


 


Hgb  12.0    (12.0-16.0)  g/dl


 


Hct  36    (35-47)  %


 


MCV  88    (80-97)  fL


 


MCH  30    (27-31)  pg


 


MCHC  34    (31-36)  g/dl


 


RDW  13    (10.5-15)  %


 


Plt Count  238    (150-450)  10^3/ul


 


MPV  8    (7.4-10.4)  um3


 


Neut % (Auto)  45.9    (38-83)  %


 


Lymph % (Auto)  39.5    (25-47)  %


 


Mono % (Auto)  9.0    (1-9)  %


 


Eos % (Auto)  5.2    (0-6)  %


 


Baso % (Auto)  0.4    (0-2)  %


 


Absolute Neuts (auto)  2.1    (1.5-7.7)  10^3/ul


 


Absolute Lymphs (auto)  1.8    (1.0-4.8)  10^3/ul


 


Absolute Monos (auto)  0.4    (0-0.8)  10^3/ul


 


Absolute Eos (auto)  0.2    (0-0.6)  10^3/ul


 


Absolute Basos (auto)  0    (0-0.2)  10^3/ul


 


Absolute Nucleated RBC  0    10^3/ul


 


Nucleated RBC %  0    


 


Sodium   132 L   (133-145)  mmol/L


 


Potassium   3.6   (3.5-5.0)  mmol/L


 


Chloride   97 L   (101-111)  mmol/L


 


Carbon Dioxide   29   (22-32)  mmol/L


 


Anion Gap   6   (2-11)  mmol/L


 


BUN   12   (6-24)  mg/dL


 


Creatinine   0.85   (0.51-0.95)  mg/dL


 


Est GFR ( Amer)   104.0   (>60)  


 


Est GFR (Non-Af Amer)   80.8   (>60)  


 


BUN/Creatinine Ratio   14.1   (8-20)  


 


Glucose   509 H*   ()  mg/dL


 


Lactic Acid    1.4  (0.5-2.0)  mmol/L


 


Calcium   9.2   (8.6-10.3)  mg/dL


 


Total Bilirubin   0.40   (0.2-1.0)  mg/dL


 


AST   47 H   (13-39)  U/L


 


ALT   46   (7-52)  U/L


 


Alkaline Phosphatase   74   ()  U/L


 


Total Protein   6.8   (6.4-8.9)  g/dL


 


Albumin   3.8   (3.2-5.2)  g/dL


 


Globulin   3.0   (2-4)  g/dL


 


Albumin/Globulin Ratio   1.3   (1-3)  














Assess/Plan/Problems-Billing


Assessment: 





26 F h/o ICDU and Type I DM p/w right arm ulcer and cellulitis 





- Patient Problems


(1) Cellulitis


Current Visit: Yes   Status: Acute   Code(s): L03.90 - CELLULITIS, UNSPECIFIED 

  SNOMED Code(s): 660278641


   Comment: 


minimal drainage this AM 


US~ no abscess 


Patient remains afebrile 


multiple allergies with noted anaphylaxis - tx levaquin 


MRSA negative - vancomycin started 2 DAYS AGO


 


Will discharge home on Levaquin 750 mg daily for 10 days    





(2) DKA, type 1


Current Visit: Yes   Status: Acute   Code(s): E10.10 - TYPE 1 DIABETES MELLITUS 

WITH KETOACIDOSIS WITHOUT COMA   SNOMED Code(s): 720623743


   Comment: 


Continue with insulin pump ~ stopped will place on SS scale and CC coverage and 

lantus with AC and HS finger sticks 


Currently  has basal rate and self boluses based on carb counting and SS - will 

continue ~ patient reports that her insulin is old has not had a new script in 

1 years, just recently had old PMD refill her insulin as she was out.  reports 

that she has not had supplies for her insulin pump sent to her 6 months- states 

nayana laura had supplies that were leftover and has been using them-   she is not 

forthcoming with information about her supplies.


Will have case management see to ensure supplies are available





Check HbA1c~ 9.3





Bllod glucoseIMPROVED 146 this AM





Will need assistance established PCP for continued management    





(3) IVDU (intravenous drug user)


Current Visit: Yes   Status: Acute   Code(s): F19.90 - OTHER PSYCHOACTIVE 

SUBSTANCE USE, UNSPECIFIED, UNCOMPLICATED   SNOMED Code(s): 688716988


   Comment: reportedly none x 3-4 months


suboxone    





(4) Nicotine abuse


Current Visit: Yes   Status: Acute   Code(s): Z72.0 - TOBACCO USE   SNOMED Code(

s): 626450123


   Comment: inhaler


declines patch    





(5) DVT prophylaxis


Current Visit: Yes   Status: Acute   Code(s): URU0150 -    SNOMED Code(s): 

954284395


   Comment: low risk


OOB ad chito   





(6) Full code status


Current Visit: Yes   Status: Acute   Code(s): Z78.9 - OTHER SPECIFIED HEALTH 

STATUS   SNOMED Code(s): 829304503


   


Status and Disposition: 





DISCHARGE HOME

## 2018-02-15 NOTE — DS
CC:  Kimberly Garg NP *

 

DISCHARGE SUMMARY:

 

DATE OF ADMISSION:  02/11/18

 

DATE OF DISCHARGE:  02/14/18

 

PROVIDER:  Ashanti Montez NP

 

ATTENDING PHYSICIAN:  Carolina Redmond MD * (dictated by Ashanti Montez NP)

 

PRIMARY CARE PROVIDER:  New primary care provider, Kimberly Garg NP

 

PRIMARY DIAGNOSIS:  Cellulitis.

 

SECONDARY DIAGNOSES:

1.  Type 1 diabetes on an insulin pump.

2.  History of methicillin-resistant Staphylococcus aureus.

3.  History of IV drug use, stopped 3 months ago and is currently on Suboxone.

 

STUDIES COMPLETED WHILE IN THE HOSPITAL:  She had an ultrasound of the soft 
tissue of her right forearm, which showed ultrasound caudal to the right elbow 
corresponding to the region of clinical concern documented extensive infiltrate 
edema within the subcutaneous tissue plane, no loculated fluid collection 
evident, no conspicuous foreign body evident.

 

DISCHARGE MEDICATIONS:  

1.  Levaquin 750 mg p.o. daily for 10 days.

 

Continued home medications:

1.  Humalog insulin pump as previously prescribed.

2.  Ibuprofen 600 mg every 6 hours as needed for pain.

3.  Suboxone 8/2 mg sublingual tab, 1 tab p.o. daily.

 

HISTORY OF PRESENT ILLNESS AND HOSPITAL COURSE:  Ms. Handy is a 26-year-old 
female with a past medical history of IV drug use and a history of MRSA who 
presented to the emergency room with worsening right forearm swelling, redness, 
and pain.  The patient states for the past 4 to 5 days, she noticed that her 
right arm had redness and swelling and it had a hard bump initially and then 
was able to express pus, but now there is an open wound there and she has 
significant amount of pain and discomfort.  The patient states that she felt 
warm 1 day prior to her admission and vomited twice.  Today, she has been 
sleeping all day, very lethargic.  No appetite. She states that her blood 
sugars are not really well controlled.  She states she ran out of strips, had 
strips for several months and managed her insulin with carb counting and 
continuous infusion.  She has had an issue trying to reestablish with a primary 
care due to her insurance reasons.  Regarding her IV drug use history, she 
states that she last injected 3 months ago.  She on admission denied any chest 
pain, shortness of breath.  No further nausea, vomiting.  No abdominal pain.  
No urinary symptoms.  Otherwise, remaining review of systems was all negative.

 

In the emergency room, the patient had labs.  She was given a liter of fluid.  
She was told to give herself 10 units of her insulin through a pump.  She was 
given 30 mg of Toradol, Levaquin, and vancomycin.  Referred for evaluation of 
her cellulitis and hyperglycemia.  While in the hospital, her blood sugars 
continue to be elevated and in discussing her insulin pump with her, the 
patient reports that her supplies need to be changed in that she has not had 
new supplies sent to her in approximately 6 months.  She reports that her 
insulin was an old vial of insulin and that she has not had her insulin 
refilled until lately, but she used up the old vial of insulin from 
approximately a year ago.  After that discussion, she discontinued her insulin 
pump and we changed her to Lantus and carb counting and sliding scale coverage.
  Her blood sugars were more decreased.  The patient states today that she does 
have insulin pump supplies at home.  She was given a prescription for insulin 
upon discharge.  Today, she denies any nausea, vomiting, or diarrhea.  Denies 
any increased pain.  Reports that the redness and swelling in her right arm and 
pain have subsided.  At this time, she is stable for discharge home.

 

Ms. Handy will be discharged back home today.  Vital signs are as follows, 
blood pressure 143/60, temp was 97.5, heart rate was 59, respirations 16, O2 
saturation was 100% on room air.

 

DISCHARGE PLAN:

1.  Ms. Handy will be discharged back home.

2.  Activity as tolerated.

3.  For treatment of her cellulitis, she should continue Levaquin 750 mg p.o. 
daily for the next 10 days.  She was instructed to watch for signs of increased 
infection, redness, swelling, or any red streaking or increased drainage, 
increased pain.  She was instructed to return to the emergency room for any of 
those symptoms and verbalized understanding.

4.  Diabetes.  Per diabetic treatment, she was instructed to change all of her 
insulin pump supplies and replace her current insulin cartridge with new 
insulin. She verbalized understanding.  She does report that she has all of the 
insulin and pump supplies at home.  She was provided with a new prescription 
for Humalog insulin at discharge.  I also provided her with testing strips and 
lancets to check her blood sugar.

 

FOLLOWUP:  She should follow up with a new established primary care provider, 
Kimberly Garg, on 02/19/18 at 10 a.m.  The patient was informed of this 
appointment and verbalized understanding.  We also sent a referral to [facility] for an appointment in 4 to 7 days to assist with her 
diabetes and management.

 

The patient was instructed to return to the emergency room for any increased 
redness, drainage, fevers, chills, or pain to her right arm as well as chest 
pain or shortness of breath or the inability to control her blood sugars.  The 
patient verbalized understanding.

 

This is a summary of her medical hospitalization.  For further details, please 
see the entire medical record.

 

TIME SPENT:  Time spent on this discharge was approximately 60 minutes, greater 
than half that time was spent with the patient discussing her discharge plans.

 

CONDITION ON DISCHARGE:  Stable.

 

____________________________________ ASHANTI MONTEZ, GERRY

 

[id number]/CPS #: [id number]

MARNI

## 2019-06-05 NOTE — UC
Skin Complaint HPI





- HPI Summary


HPI Summary: 


27-year-old female with type 1 diabetes presents with complaints of an abscess 

to her left upper arm for the past 2 days.  States she has a history of 

multiple abscesses in the past.  She has tested positive for MRSA once.  

Patient has a past history of IV drug use but declines current IV drug use.  

She is followed at [facility] for her primary care.  Denies fever, chills, 

weakness, dizziness, or malaise. 








- History of Current Complaint


Chief Complaint: UCSkin


Stated Complaint: WOUND ON ARM


Hx Obtained From: Patient


Hx Last Menstrual Period: 2 weeks ago


Pain Intensity: 5





- Allergy/Home Medications


Allergies/Adverse Reactions: 


 Allergies











Allergy/AdvReac Type Severity Reaction Status Date / Time


 


amoxicillin Allergy  Anaphylatic Verified 06/05/19 22:16





   Shock  


 


cefaclor [From Ceclor] Allergy  Anaphylatic Verified 06/05/19 22:16





   Shock  


 


Penicillins Allergy  Anaphylatic Verified 06/05/19 22:16





   Shock  











Home Medications: 


 Home Medications





Insulin Glargine,Hum.rec.anlog [Basaglar Kwikpen] 22 unit SC DAILY 06/05/19 [

History Confirmed 06/05/19]











PMH/Surg Hx/FS Hx/Imm Hx


Endocrine History: Diabetes


Other History Of: 


   Negative For: HIV, Hepatitis B, Hepatitis C





- Surgical History


Surgical History: Yes


Surgery Procedure, Year, and Place: C-SECTIONS x 2





- Family History


Known Family History: Positive: Non-Contributory





- Social History


Occupation: Works From/At Home


Lives: With Family


Alcohol Use: None


Substance Use Type: Heroin


Substance Use Comment - Amount & Last Used: Pt states she has been clean for 3 

months


Smoking Status (MU): Heavy Every Day Tobacco Smoker


Type: Cigarettes


Amount Used/How Often: 1/2 PPD


Length of Time of Smoking/Using Tobacco: 7


Have You Smoked in the Last Year: Yes


Household Exposure Type: Cigarettes





- Immunization History


Most Recent Influenza Vaccination: 2010


Most Recent Tetanus Shot: UNSURE


Most Recent Pneumonia Vaccination: NEVER





Review of Systems


All Other Systems Reviewed And Are Negative: Yes


Constitutional: Negative: Fever, Chills


Skin: Positive: Other - see HPI


Respiratory: Positive: Negative


Cardiovascular: Positive: Negative


Gastrointestinal: Positive: Negative


Genitourinary: Positive: Negative


Musculoskeletal: Positive: Negative


Neurological: Positive: Negative


Is Patient Immunocompromised?: No





Physical Exam





- Summary


Physical Exam Summary: 


GENERAL APPEARANCE: Well developed, well nourished, alert and cooperative, and 

appears to be in no acute distress.





CARDIAC: Normal S1 and S2. No S3, S4 or murmurs. Rhythm is regular. There is no 

peripheral edema, cyanosis or pallor. Extremities are warm and well perfused. 

Capillary refill is less than 2 seconds. Peripheral pulses intact.





LUNGS: Clear to auscultation without rales, rhonchi, wheezing or diminished 

breath sounds.





ABDOMEN: Positive bowel sounds. Soft, nondistended, nontender. No guarding or 

rebound. No masses or hepatosplenomegally.





MUSKULOSKELETAL: ROM intact to all extremities. No joint erythema or 

tenderness. Normal muscular development. Normal gait.





SKIN: 4 cm x 3.5 cm area of erythema and induration with a large amount of 

fluctuance to her left anterior upper arm





Triage Information Reviewed: Yes


Vital Signs: 


 Initial Vital Signs











Temp  99.2 F   06/05/19 22:18


 


Pulse  88   06/05/19 22:18


 


Resp  18   06/05/19 22:18


 


BP  129/68   06/05/19 22:18


 


Pulse Ox  99   06/05/19 22:18











Vital Signs Reviewed: Yes





Procedures





- Procedure Summary


Procedure Summary: 


PROCEDURE NOTE: Incision and drainage


 


PROCEDURE: Informed consent was obtained and timeout protocol was performed 

prior to initiating the procedure. The skin was prepped with Betadine and the 

area draped in the usual manner. Patient declined local anesthesia and her 

declination was witnessed by Chelsea Alva RN.  A stab incision was made into 

the center of the abscess and the purulent material expressed. A wound culture 

was taken and sent. The wound was irrigated copiously with sterile saline. A 

bulky gauze dressing was then placed. Bleeding was minimal. 


 


The patient tolerated the procedure well without complications.  Standard post-

procedure care is explained and return precautions were given.








Course/Dx





- Course


Course Of Treatment: 


27-year-old female with type 1 diabetes presents with complaints of an abscess 

to her left upper arm for the past 2 days.  States she has a history of 

multiple abscesses in the past.  She has tested positive for MRSA once.  

Patient has a past history of IV drug use but declines current IV drug use.  

She is followed at [facility] for her primary care.  Denies fever, chills, 

weakness, dizziness, or malaise.  Afebrile.  Vital signs stable.  Patient had a 

4 cm x 3.5 cm area of erythema and induration with a large amount of fluctuance 

to her left anterior upper arm and otherwise unremarkable exam.  An I&D was 

performed.  Patient declined local anesthesia.  I explained to the patient that 

without proper anesthesia and may not be able to fully drained and clean the 

abscess adequately.  Patient verbalized understanding and continued to decline.

  Because the patient did not receive local anesthesia I only used a stab 

incision to the abscess.  I was able to drain a large amount of purulent 

drainage from the abscess.  A wound culture was taken and sent.  I then 

irrigated the wound with a copious amount of sterile saline.  A bulky gauze 

dressing was then applied by the RN.  Patient was given a dose of Bactrim DS 1 

tab PO and naproxen 500 mg PO in the clinic.  She is to continue taking Bactrim 

DS 1 tab twice a day 10 days.  With her history of diabetes and because I did 

not feel that was able to do an adequate I&D that would allow me to break up 

any loculations I encouraged the patient to return here or follow up at 

[facility] in 2 days to have a wound check.  Anticipatory guidance and 

warning symptoms were reviewed with the patient.  She verbalizes understanding 

and agrees with plan of care.








- Differential Diagnoses - Skin Complaint


Differential Diagnoses: Abscess, MRSA





- Diagnoses


Provider Diagnosis: 


 Abscess of left arm








Discharge





- Sign-Out/Discharge


Documenting (check all that apply): Patient Departure


All imaging exams completed and their final reports reviewed: No Studies





- Discharge Plan


Condition: Stable


Disposition: HOME


Prescriptions: 


Sulfamethox/Trimethoprim DS* [Bactrim /160 TAB*] 1 tab PO BID #20 tab


Patient Education Materials:  Abscess (ED)


Referrals: 


No Primary Care Phys,NOPCP [Primary Care Provider] - 


Additional Instructions: 


The abscess to your left upper arm was drained in the clinic.  A wound culture 

was sent and we will contact you if it shows that we need to make any changes 

to your plan of care.





Leave the dressing that was applied in the clinic in place for the next 24 

hours.  After 24 hours you may remove and shower as normal.





You should apply a gauze dressing to the wound to catch any drainage.  This 

should be changed at least twice a day.





We will start should on an antibiotic to treat you for the infection.  Take 

Bactrim DS one tablet twice a day for 10 days.  You're given the first dose in 

the clinic.





Use acetaminophen (Tylenol) see or ibuprofen (Advil, Motrin) according to 

directions as needed for pain.





Return here or follow-up at [facility] within 2 days for recheck of 

the wound. Call [phone] to schedule an appointment at [facility].





Seek immediate medical attention in the emergency room if you develop a fever 

greater than 100.5 F, has severe pain that is not managed with pain medication, 

redness that spreads, red streak up her arm, swelling of the arm, or any 

worsening of symptoms.





- Billing Disposition and Condition


Condition: STABLE


Disposition: Home